# Patient Record
Sex: FEMALE
[De-identification: names, ages, dates, MRNs, and addresses within clinical notes are randomized per-mention and may not be internally consistent; named-entity substitution may affect disease eponyms.]

---

## 2018-07-10 ENCOUNTER — HOSPITAL ENCOUNTER (INPATIENT)
Dept: HOSPITAL 14 - H.ER | Age: 73
LOS: 3 days | Discharge: HOME | DRG: 574 | End: 2018-07-13
Attending: GENERAL ACUTE CARE HOSPITAL | Admitting: GENERAL ACUTE CARE HOSPITAL
Payer: COMMERCIAL

## 2018-07-10 DIAGNOSIS — E78.00: ICD-10-CM

## 2018-07-10 DIAGNOSIS — J98.11: ICD-10-CM

## 2018-07-10 DIAGNOSIS — Z79.82: ICD-10-CM

## 2018-07-10 DIAGNOSIS — Z92.3: ICD-10-CM

## 2018-07-10 DIAGNOSIS — E11.9: ICD-10-CM

## 2018-07-10 DIAGNOSIS — Z85.42: ICD-10-CM

## 2018-07-10 DIAGNOSIS — Z92.21: ICD-10-CM

## 2018-07-10 DIAGNOSIS — Z79.84: ICD-10-CM

## 2018-07-10 DIAGNOSIS — I10: ICD-10-CM

## 2018-07-10 DIAGNOSIS — K86.2: ICD-10-CM

## 2018-07-10 DIAGNOSIS — D72.828: Primary | ICD-10-CM

## 2018-07-10 DIAGNOSIS — M17.11: ICD-10-CM

## 2018-07-10 DIAGNOSIS — K59.00: ICD-10-CM

## 2018-07-10 LAB
ALBUMIN SERPL-MCNC: 4 G/DL (ref 3.5–5)
ALBUMIN/GLOB SERPL: 1.1 {RATIO} (ref 1–2.1)
ALT SERPL-CCNC: 30 U/L (ref 9–52)
APPEARANCE CSF: (no result)
APTT BLD: 32.1 SECONDS (ref 25.6–37.1)
AST SERPL-CCNC: 45 U/L (ref 14–36)
BACTERIA #/AREA URNS HPF: (no result) /[HPF]
BASE EXCESS BLDV CALC-SCNC: 4.4 MMOL/L (ref 0–2)
BASOPHILS # BLD AUTO: 0.1 K/UL (ref 0–0.2)
BASOPHILS NFR BLD: 0.4 % (ref 0–2)
BILIRUB UR-MCNC: NEGATIVE MG/DL
BODY FLD TYPE: (no result)
BUN SERPL-MCNC: 17 MG/DL (ref 7–17)
CALCIUM SERPL-MCNC: 9.3 MG/DL (ref 8.4–10.2)
COLOR UR: YELLOW
CSF MONO/MACROPHAGE: 1 % (ref 0–0)
CSF WBC: 1 /MM3 (ref 0–5)
EOSINOPHIL # BLD AUTO: 0.1 K/UL (ref 0–0.7)
EOSINOPHIL NFR BLD: 0.5 % (ref 0–4)
EOSINOPHIL NFR BLD: 1 % (ref 0–7)
ERYTHROCYTE [DISTWIDTH] IN BLOOD BY AUTOMATED COUNT: 13.3 % (ref 11.5–14.5)
GFR NON-AFRICAN AMERICAN: > 60
GLUCOSE UR STRIP-MCNC: 50 MG/DL
HGB BLD-MCNC: 12.4 G/DL (ref 12–16)
INR PPP: 1 (ref 0.9–1.2)
LEUKOCYTE ESTERASE UR-ACNC: (no result) LEU/UL
LYMPHOCYTE: 2 % (ref 20–50)
LYMPHOCYTES # BLD AUTO: 0.6 K/UL (ref 1–4.3)
LYMPHOCYTES NFR BLD AUTO: 3.3 % (ref 20–40)
MCH RBC QN AUTO: 31.6 PG (ref 27–31)
MCHC RBC AUTO-ENTMCNC: 33.3 G/DL (ref 33–37)
MCV RBC AUTO: 94.9 FL (ref 81–99)
MONOCYTE: 4 % (ref 0–10)
MONOCYTES # BLD: 0.9 K/UL (ref 0–0.8)
MONOCYTES NFR BLD: 4.5 % (ref 0–10)
NEUTROPHILS # BLD: 17.7 K/UL (ref 1.8–7)
NEUTROPHILS NFR BLD AUTO: 90 % (ref 42–75)
NEUTROPHILS NFR BLD AUTO: 91.3 % (ref 50–75)
NEUTS BAND NFR BLD: 3 % (ref 0–2)
NRBC BLD AUTO-RTO: 0 % (ref 0–0)
PCO2 BLDV: 43 MMHG (ref 40–60)
PH BLDV: 7.44 [PH] (ref 7.32–7.43)
PH UR STRIP: 7 [PH] (ref 5–8)
PLATELET # BLD EST: NORMAL 10*3/UL
PLATELET # BLD: 222 K/UL (ref 130–400)
PMV BLD AUTO: 8.6 FL (ref 7.2–11.7)
PROT UR STRIP-MCNC: NEGATIVE MG/DL
PROTHROMBIN TIME: 11.5 SECONDS (ref 9.8–13.1)
RBC # BLD AUTO: 3.93 MIL/UL (ref 3.8–5.2)
RBC # UR STRIP: NEGATIVE /UL
RBC MORPH BLD: NORMAL
SP GR UR STRIP: 1.02 (ref 1–1.03)
SPECIMEN VOL CSF: 1 ML (ref 0–1)
TOTAL CELLS COUNTED BLD: 100
URINE CLARITY: (no result)
UROBILINOGEN UR-MCNC: (no result) MG/DL (ref 0.2–1)
VENOUS BLOOD FIO2: 21 %
VENOUS BLOOD GAS PO2: 46 MM/HG (ref 30–55)
WBC # BLD AUTO: 19.4 K/UL (ref 4.8–10.8)

## 2018-07-10 NOTE — RAD
Date of service: 



07/10/2018



HISTORY:

Sepsis Patient  



COMPARISON:

She 03/27/2017 



FINDINGS:



LUNGS:

No active pulmonary disease.



PLEURA:

No significant pleural effusion identified, no pneumothorax apparent.



CARDIOVASCULAR:

Cardiomegaly.  No evidence of acute, significant cardiovascular 

disease. 



OSSEOUS STRUCTURES:

No significant abnormalities.



VISUALIZED UPPER ABDOMEN:

Normal.



OTHER FINDINGS:

Central hilar fullness likely vascular.



IMPRESSION:

No active disease. No significant interval change compared to the 

prior examination(s).

## 2018-07-10 NOTE — CT
Date of service: 



07/10/2018



PROCEDURE:  CT Abdomen and Pelvis with contrast



HISTORY:

LLQ pain, fever



COMPARISON:

None.



TECHNIQUE:

Contrast dose: 95 cc Omnipaque 300



Radiation dose:



Total exam DLP = 676.38 mGy-cm.



This CT exam was performed using one or more of the following dose 

reduction techniques: Automated exposure control, adjustment of the 

mA and/or kV according to patient size, and/or use of iterative 

reconstruction technique. . 



FINDINGS:



LOWER THORAX:

Minor bibasilar passive/dependent type atelectasis both lung bases. 

Minor scarring in the lingular and middle lobe regions as well. . No 

evidence of effusion or basilar pneumothorax. 



Heart size is enlarged. No significant pericardial effusion. 



Tiny hiatal hernia. 



LIVER:

Liver is upper limits of normal measuring over 18 cm in CC dimension. 

Minor fatty hepatic infiltration. No obvious hepatic mass collection 

or calcification.  Portal and splenic veins are opacified. 



GALLBLADDER AND BILE DUCTS:

Cholecystectomy 



PANCREAS:

Pancreas is atrophic fatty replaced. .  There is a well-circumscribed 

small elliptical shaped approximately 14 point 4 x 10.2 times 13 

point 3 mm low-attenuation lesion within the mid to distal body of 

the pancreas that exhibits Hounsfield units in the ranging between 

mid single digits.  There also appears to be a tiny calcification 

along the inferior margin of this low-attenuation lesion. . The 

lesion is of uncertain etiology though could represent a pseudocyst 

possibility of a cystic neoplasm not excluded. .  Followup the CT 

scan at interval could be performed to assess stability. Clinical 

correlation with GI consultation recommended for further evaluation. 



SPLEEN:

The spleen exhibits normal size and attenuation pattern.  No masses 

collections or calcifications. 



ADRENALS:

There are no adrenal lesions 



KIDNEYS AND URETERS:

Kidneys demonstrate symmetric nephrograms.  No evidence of 

nephrolithiasis or hydronephrosis.  No obvious renal mass or 

collection. 



VASCULATURE:

Unremarkable. No aortic aneurysm. 



BOWEL:

Evaluation of the bowel is limited due to the lack of oral contrast 

material. 



The stomach is incompletely distended which presumably accounts for 

thick-walled appearance.  Visualized loops of small bowel exhibit 

normal contour and caliber. No evidence of acute mechanical small 

bowel obstruction.  There there is fecalized content within the 

distal small bowel suggesting stasis. Note made of a few loops of 

proximal small bowel left mid abdomen which exhibit questionable mild 

wall thickening.  Rule out enteritis. 



. 



Stool and air seen throughout the large bowel suggesting mild fecal 

retention. No definitive mural wall thickening of the colon. . 



APPENDIX:

What may represent a short but normal-appearing appendix seen on 

coronal image number [id number] and axial image number [id number]. No 

periappendiceal inflammatory changes. 



PERITONEUM:

Unremarkable. No free fluid. No free air. Small fat containing 

bilateral inguinal hernias are present left slightly larger than 

right. 



LYMPH NODES:

Multiple small nonspecific retroperitoneal lymph nodes are noted. . 



BLADDER:

Urinary bladder is incompletely distended which in part accounts for 

thick-walled appearance however cystitis must be excluded.  

Correlation urinalysis. 



REPRODUCTIVE:

Uterus unremarkable aside from what probably represents some 

myometrial vascular calcifications. 



BONES:

Mild multilevel degenerative spondylosis of the lower thoracic and 

lumbar spine. 



No acute compression fractures no retropulsed fragments. 



OTHER FINDINGS:

None.



IMPRESSION:

Liver is upper limits of normal.  Mild fatty hepatic infiltration. 



Cholecystectomy. 



There is a small cystic lesion within the mid body of the pancreas of 

uncertain etiology. Rule out pseudocyst versus cystic neoplasm.  GI 

consultation suggested. 



Note made of a few loops of proximal small bowel left mid abdomen 

which exhibit questionable mild wall thickening.  Rule out enteritis. 



Findings consistent with mild fecal retention/constipation as 

described above. 



Urinary bladder wall thickening likely due to incomplete distention 

however correlation with urinalysis recommended to exclude the 

possibility of a cystitis

## 2018-07-10 NOTE — US
Date of service: 



07/10/2018



PROCEDURE:  Right lower extremity venous duplex Doppler. 



HISTORY:

pain on right thigh







COMPARISON:

None available.



TECHNIQUE:

Common femoral, superficial femoral, popliteal and posterior tibial 

veins were evaluated. Flow was assessed with color Doppler, 

compressibility, assessment of phasic flow and augmentation response. 



FINDINGS:



COMMON FEMORAL VEIN:

Unremarkable.



SUPERFICIAL FEMORAL VEIN:

Unremarkable.



POPLITEAL VEIN:

Unremarkable.



POSTERIOR TIBIAL VEIN:

Unremarkable.



OTHER FINDINGS:

None.



IMPRESSION:

No evidence of deep venous thrombosis in the right lower extremity.

## 2018-07-10 NOTE — CP.PCM.HP
History of Present Illness





- History of Present Illness


History of Present Illness: 





72 yo female with history of DM2 and HTN brought by family because of fever and 

chills since yesterday. The only accompanying symptoms were headache and right 

thigh pain probably associated with a fall last week. Denied abdominal pain, 

nausea/vomiting or diarrhea. Also denied dysuria, coughing, chest pain or SOB.














Present on Admission





- Present on Admission


Any Indicators Present on Admission: No


History of DVT/PE: No


History of Uncontrolled Diabetes: No


Urinary Catheter: No


Decubitus Ulcer Present: No





Review of Systems





- Review of Systems


All systems: reviewed and no additional remarkable complaints except (aside 

from those mentioned above, 12 point system review were negative by me)





Past Patient History





- Tetanus Immunizations


Tetanus Immunization: Unknown





- Past Medical History & Family History


Past Medical History?: Yes





- Past Social History


Smoking Status: Never Smoked


Chewing Tobacco Use: No


Cigar Use: No


Alcohol: None


Drugs: Denies


Home Situation {Lives}: With Family





- CARDIAC


Hx Hypercholesterolemia: Yes


Hx Hypertension: Yes





- PULMONARY


Hx Respiratory Disorders: No





- NEUROLOGICAL


Hx Neurological Disorder: No





- HEENT


Hx HEENT Problems: No





- RENAL


Hx Chronic Kidney Disease: No





- ENDOCRINE/METABOLIC


Hx Endocrine Disorders: Yes


Hx Diabetes Mellitus Type 2: Yes





- HEMATOLOGICAL/ONCOLOGICAL


Hx Human Immunodeficiency Virus (HIV): No





- INTEGUMENTARY


Hx Dermatological Problems: No





- MUSCULOSKELETAL/RHEUMATOLOGICAL


Hx Musculoskeletal Disorders: No





- GASTROINTESTINAL


Hx Pancreatitis: Yes





- GENITOURINARY/GYNECOLOGICAL


Hx Genitourinary Disorders: Yes


Hx Uterine Cancer: Yes (treated with Radiation and chemo 13 yrs ago)


Other/Comment: Hx cystitis





- PSYCHIATRIC


Hx Psychophysiologic Disorder: No


Hx Substance Use: No





- SURGICAL HISTORY


Hx Cholecystectomy: Yes





- ANESTHESIA


Hx Anesthesia: Yes


Hx Anesthesia Reactions: No


Hx Malignant Hyperthermia: No





Meds


Allergies/Adverse Reactions: 


 Allergies











Allergy/AdvReac Type Severity Reaction Status Date / Time


 


No Known Allergies Allergy   Verified 04/15/18 23:54














Physical Exam





- Constitutional


Appears: No Acute Distress





- Head Exam


Head Exam: ATRAUMATIC





- Eye Exam


Eye Exam: PERRL.  absent: Scleral icterus





- ENT Exam


ENT Exam: Mucous Membranes Moist





- Neck Exam


Neck exam: Negative for: Meningismus





- Respiratory Exam


Respiratory Exam: absent: Rales, Rhonchi, Wheezes, Respiratory Distress





- Cardiovascular Exam


Cardiovascular Exam: REGULAR RHYTHM, +S1, +S2





- GI/Abdominal Exam


GI & Abdominal Exam: Soft.  absent: Tenderness





- Extremities Exam


Extremities exam: Positive for: normal inspection (there is no bruising, 

swelling or tenderness on right thigh or any other limbs)





Results





- Vital Signs


Recent Vital Signs: 





 Last Vital Signs











Temp  101.6 F H  07/10/18 09:35


 


Pulse  104 H  07/10/18 09:35


 


Resp      


 


BP  148/80   07/10/18 09:35


 


Pulse Ox  96   07/10/18 10:56














- Labs


Result Diagrams: 


 07/10/18 11:05





 07/10/18 11:05


Labs: 





 Laboratory Results - last 24 hr











  07/10/18 07/10/18 07/10/18





  11:05 11:05 11:05


 


WBC  19.4 H D  


 


RBC  3.93  


 


Hgb  12.4  


 


Hct  37.3  


 


MCV  94.9  


 


MCH  31.6 H  


 


MCHC  33.3  


 


RDW  13.3  


 


Plt Count  222  


 


MPV  8.6  


 


Neut % (Auto)  91.3 H  


 


Lymph % (Auto)  3.3 L  


 


Mono % (Auto)  4.5  


 


Eos % (Auto)  0.5  


 


Baso % (Auto)  0.4  


 


Neut # (Auto)  17.7 H  


 


Lymph # (Auto)  0.6 L  


 


Mono # (Auto)  0.9 H  


 


Eos # (Auto)  0.1  


 


Baso # (Auto)  0.1  


 


Neutrophils % (Manual)  90 H  


 


Band Neutrophils %  3 H  


 


Lymphocytes % (Manual)  2 L  


 


Monocytes % (Manual)  4  


 


Eosinophils % (Manual)  1  


 


Platelet Estimate  Normal  


 


RBC Morphology  Normal  


 


PT    11.5


 


INR    1.0


 


APTT    32.1


 


pO2   


 


VBG pH   


 


VBG pCO2   


 


VBG HCO3   


 


VBG Total CO2   


 


VBG O2 Sat (Calc)   


 


VBG Base Excess   


 


VBG Potassium   


 


Glucose   


 


Lactate   


 


FiO2   


 


Sodium   139 


 


Potassium   3.7 


 


Chloride   102 


 


Carbon Dioxide   26 


 


Anion Gap   15 


 


BUN   17 


 


Creatinine   0.6 L 


 


Est GFR ( Amer)   > 60 


 


Est GFR (Non-Af Amer)   > 60 


 


POC Glucose (mg/dL)   


 


Random Glucose   144 H 


 


Calcium   9.3 


 


Phosphorus   2.4 L 


 


Magnesium   1.6 


 


Total Bilirubin   0.6 


 


AST   45 H D 


 


ALT   30 


 


Alkaline Phosphatase   82 


 


Total Protein   7.5 


 


Albumin   4.0 


 


Globulin   3.5 


 


Albumin/Globulin Ratio   1.1 


 


Venous Blood Potassium   


 


Urine Color   


 


Urine Clarity   


 


Urine pH   


 


Ur Specific Gravity   


 


Urine Protein   


 


Urine Glucose (UA)   


 


Urine Ketones   


 


Urine Blood   


 


Urine Nitrate   


 


Urine Bilirubin   


 


Urine Urobilinogen   


 


Ur Leukocyte Esterase   


 


Urine RBC (Auto)   


 


Urine Microscopic WBC   


 


Urine Bacteria   














  07/10/18 07/10/18 07/10/18





  11:15 11:22 11:35


 


WBC   


 


RBC   


 


Hgb   


 


Hct   


 


MCV   


 


MCH   


 


MCHC   


 


RDW   


 


Plt Count   


 


MPV   


 


Neut % (Auto)   


 


Lymph % (Auto)   


 


Mono % (Auto)   


 


Eos % (Auto)   


 


Baso % (Auto)   


 


Neut # (Auto)   


 


Lymph # (Auto)   


 


Mono # (Auto)   


 


Eos # (Auto)   


 


Baso # (Auto)   


 


Neutrophils % (Manual)   


 


Band Neutrophils %   


 


Lymphocytes % (Manual)   


 


Monocytes % (Manual)   


 


Eosinophils % (Manual)   


 


Platelet Estimate   


 


RBC Morphology   


 


PT   


 


INR   


 


APTT   


 


pO2  46  


 


VBG pH  7.44 H  


 


VBG pCO2  43  


 


VBG HCO3  28.0  


 


VBG Total CO2  30.5 H  


 


VBG O2 Sat (Calc)  89.0 H  


 


VBG Base Excess  4.4 H  


 


VBG Potassium  3.4 L  


 


Glucose  151 H  


 


Lactate  1.9  


 


FiO2  21.0  


 


Sodium  135.0  


 


Potassium   


 


Chloride  102.0  


 


Carbon Dioxide   


 


Anion Gap   


 


BUN   


 


Creatinine   


 


Est GFR ( Amer)   


 


Est GFR (Non-Af Amer)   


 


POC Glucose (mg/dL)   139 H 


 


Random Glucose   


 


Calcium   


 


Phosphorus   


 


Magnesium   


 


Total Bilirubin   


 


AST   


 


ALT   


 


Alkaline Phosphatase   


 


Total Protein   


 


Albumin   


 


Globulin   


 


Albumin/Globulin Ratio   


 


Venous Blood Potassium  3.4 L  


 


Urine Color    Yellow


 


Urine Clarity    Slighty-cloudy


 


Urine pH    7.0


 


Ur Specific Gravity    1.016


 


Urine Protein    Negative


 


Urine Glucose (UA)    50


 


Urine Ketones    Negative


 


Urine Blood    Negative


 


Urine Nitrate    Negative


 


Urine Bilirubin    Negative


 


Urine Urobilinogen    0.2-1.0


 


Ur Leukocyte Esterase    Neg


 


Urine RBC (Auto)    3


 


Urine Microscopic WBC    < 1


 


Urine Bacteria    Rare














Assessment & Plan





- Assessment and Plan (Free Text)


Assessment: 





72 yo female with history of DM2 and HTN brought by family because of fever and 

chills since yesterday. The only accompanying symptoms were headache and 


right thigh pain probably associated with a fall last week. Denied abdominal 

pain, nausea/vomiting or diarrhea. Also denied dysuria, coughing, chest pain or 

SOB.








1. Fever of Unknown Origin


CT scan of abdomen: thick walled on stomach, small intestine and urinary bladder

???


patient did not have abdominal issue nor urinary problem


CXray: did not show any infiltrate


blood and urine culture


ID consult with Dr Edgar


LP done with clear CSF with elevated total cell ct of 22, neutrophil - 1; 

lymphocytes - 16; monos - 1


continue IV Acyclovir, Vanco and Ceftriaxone

## 2018-07-10 NOTE — CT
Date of service: 



07/10/2018



PROCEDURE:  CT HEAD WITHOUT CONTRAST.



HISTORY:

HA



COMPARISON:

Comparison made with CT scan brain dated 10/25/2014 



TECHNIQUE:

Axial computed tomography images were obtained through the head/brain 

without intravenous contrast.  



Radiation dose:



Total exam DLP = 1052.87 mGy-cm.



This CT exam was performed using one or more of the following dose 

reduction techniques: Automated exposure control, adjustment of the 

mA and/or kV according to patient size, and/or use of iterative 

reconstruction technique.



FINDINGS:



HEMORRHAGE:

No intracranial hemorrhage. 



BRAIN:

No evidence of large acute infarct.  Questionable few tiny chronic 

bilateral basal nuclei lacunar type infarcts. 



Ventricular and sulcal size are within range of normal for this 

patient's stated age 



VENTRICLES:

No obstructive hydrocephalus. 



CALVARIUM:

Unremarkable.



PARANASAL SINUSES:

Unremarkable as visualized. No significant inflammatory changes.



MASTOID AIR CELLS:

Unremarkable as visualized. No inflammatory changes.



OTHER FINDINGS:

None.



IMPRESSION:

No evidence of acute intracranial hemorrhage or infarct.  

Questionable few chronic bilateral basal nuclei lacunar type infarcts.

## 2018-07-10 NOTE — ED PDOC
HPI: General Adult





<Janette Schulte Y - Last Filed: 07/10/18 15:33>


Chief Complaint (Provider): Fever


History Per: Patient, Family, 


History/Exam Limitations: no limitations





<Shania Schwarz - Last Filed: 07/11/18 15:08>


Time Seen by Provider: 07/10/18 10:04


Chief Complaint (Nursing): Fever


Additional Complaint(s): 





Pt reports HA and R leg pain that started after trip and fall on street 1 week 

ago, hit head on wall, no LOC.  Also c/o fever that started yesterday, did not 

take any medications, associated with dysuria.  Denies neck stiffness, sore 

throat, cough, nausea, vomiting, diarrhea.  (ChongPrasantha CASSANDRA)





Past Medical History





<Janette Schulte Y - Last Filed: 07/10/18 15:33>


Reviewed: Nursing Documentation, Vital Signs





- Medical History


PMH: Diabetes, HTN, Hypercholesterolemia, Pancreatitis


   Denies: HIV, Chronic Kidney Disease





- Surgical History


Surgical History: Cholecystectomy





- Family History


Family History: States: Unknown Family Hx





- Living Arrangements


Living Arrangements: With Family





- Social History


Current smoker - smoking cessation education provided: No


Alcohol: None





<Shania Schwarz - Last Filed: 07/11/18 15:08>


Vital Signs: 


 Last Vital Signs











Temp  97.9 F   07/11/18 08:08


 


Pulse  101 H  07/11/18 09:41


 


Resp  20   07/11/18 08:08


 


BP  110/62   07/11/18 09:41


 


Pulse Ox  94 L  07/11/18 08:08














- Home Medications


Home Medications: 


 Ambulatory Orders











 Medication  Instructions  Recorded


 


Lisinopril [Zestril] 5 mg PO DAILY 03/28/17


 


Metformin HCl [Glucophage] 1,000 mg PO BID 03/28/17


 


Simvastatin [Zocor] 40 mg PO DAILY 03/28/17


 


Aspirin [Ecotrin] 81 mg PO DAILY 07/10/18














- Allergies


Allergies/Adverse Reactions: 


 Allergies











Allergy/AdvReac Type Severity Reaction Status Date / Time


 


No Known Allergies Allergy   Verified 04/15/18 23:54














Review of Systems


Constitutional: Positive for: Fever, Chills


Eyes: Negative for: Vision Change, Eyelid Inflammation


ENT: Negative for: Ear Pain, Ear Discharge, Nose Pain, Nose Discharge, Nose 

Congestion, Mouth Pain, Mouth Swelling, Throat Pain, Throat Swelling


Cardiovascular: Negative for: Chest Pain, Palpitations


Respiratory: Negative for: Cough, Hemoptysis


Gastrointestinal: Negative for: Nausea, Vomiting, Abdominal Pain, Diarrhea


Genitourinary Female: Positive for: Dysuria.  Negative for: Hematuria, Vaginal 

Discharge, Vaginal Bleeding, Pelvic Pain


Musculoskeletal: Positive for: Back Pain (Chronic).  Negative for: Neck Pain


Skin: Negative for: Rash, Lesions


Neurological: Positive for: Headache.  Negative for: Weakness, Numbness, 

Incoordination, Change in Speech, Confusion, Seizures, Altered Mental Status, 

Dizziness





<Shania Schwarz F - Last Filed: 07/11/18 15:08>





Physical Exam





- Reviewed


Nursing Documentation Reviewed: Yes


Vital Signs Reviewed: Yes





- Physical Exam


Appears: Positive for: Uncomfortable


Head Exam: Positive for: ATRAUMATIC, NORMAL INSPECTION


Skin: Positive for: Normal Color, Warm, Dry


Eye Exam: Positive for: Normal appearance, EOMI, PERRL


ENT: Positive for: Pharynx Is (Clear).  Negative for: Nasal Congestion, 

Pharyngeal Erythema, Tonsillar Exudate, Tonsillar Swelling


Neck: Positive for: Normal, Painless ROM, Supple


Cardiovascular/Chest: Positive for: Regular Rate, Rhythm


Respiratory: Positive for: Normal Breath Sounds.  Negative for: Rales, Rhonchi, 

Wheezing


Gastrointestinal/Abdominal: Positive for: Bowel Sounds, Soft, Tenderness (LLQ).

  Negative for: Distended, Guarding, Rebound


Back: Positive for: Normal Inspection.  Negative for: L CVA Tenderness, R CVA 

Tenderness


Extremity: Positive for: Normal ROM, Tenderness (Minimal L lateral thigh), 

Swelling (Minimal hematoma), Other (FROM @ hip, no pain on pelvic rocking ).  

Negative for: Deformity


Neurologic/Psych: Positive for: Alert, Oriented





<Shania Schwarz F - Last Filed: 07/11/18 15:08>





- Laboratory Results


Result Diagrams: 


 07/10/18 11:05





 07/10/18 11:05





<Janette Schulte - Last Filed: 07/10/18 15:33>





- Laboratory Results


Result Diagrams: 


 07/11/18 06:40





 07/11/18 06:40





- ECG


O2 Sat by Pulse Oximetry: 96





- Critical Care


Total Time (In Min): 45





<Shania Schwarz - Last Filed: 07/11/18 15:08>





Medical Decision Making





<Janette Schulte Y - Last Filed: 07/10/18 15:33>





<Shania Schwarz F - Last Filed: 07/11/18 15:08>


Medical Decision Making: 


Time; 10:30


74 yo female with head and RLE pain s/p fall and fever.


- labs


- EKG


- CXR


- CT head


- IVF


- Motrin


- Tylenol





Head CT 


FINDINGS:





HEMORRHAGE:


No intracranial hemorrhage. 





BRAIN:


No evidence of large acute infarct.  Questionable few tiny chronic bilateral 

basal nuclei lacunar type infarcts. 





Ventricular and sulcal size are within range of normal for this patient's 

stated age 





VENTRICLES:


No obstructive hydrocephalus. 





CALVARIUM:


Unremarkable.





PARANASAL SINUSES:


Unremarkable as visualized. No significant inflammatory changes.





MASTOID AIR CELLS:


Unremarkable as visualized. No inflammatory changes.





OTHER FINDINGS:


None.





IMPRESSION:


No evidence of acute intracranial hemorrhage or infarct.  Questionable few 

chronic bilateral basal nuclei lacunar type infarcts.





Abd pelvis CT 


FINDINGS:





LOWER THORAX:


Minor bibasilar passive/dependent type atelectasis both lung bases. Minor 

scarring in the lingular and middle lobe regions as well. . No evidence of 

effusion or basilar pneumothorax. 





Heart size is enlarged. No significant pericardial effusion. 





Tiny hiatal hernia. 





LIVER:


Liver is upper limits of normal measuring over 18 cm in CC dimension. Minor 

fatty hepatic infiltration. No obvious hepatic mass collection or 

calcification.  Portal and splenic veins are opacified. 





GALLBLADDER AND BILE DUCTS:


Cholecystectomy 





PANCREAS:


Pancreas is atrophic fatty replaced. .  There is a well-circumscribed small 

elliptical shaped approximately 14 point 4 x 10.2 times 13 point 3 mm low-

attenuation lesion within the mid to distal body of the pancreas that exhibits 

Hounsfield units in the ranging between mid single digits.  There also appears 

to be a tiny calcification along the inferior margin of this low-attenuation 

lesion. . The lesion is of uncertain etiology though could represent a 

pseudocyst possibility of a cystic neoplasm not excluded. .  Followup the CT 

scan at interval could be performed to assess stability. Clinical correlation 

with GI consultation recommended for further evaluation. 





SPLEEN:


The spleen exhibits normal size and attenuation pattern.  No masses collections 

or calcifications. 





ADRENALS:


There are no adrenal lesions 





KIDNEYS AND URETERS:


Kidneys demonstrate symmetric nephrograms.  No evidence of nephrolithiasis or 

hydronephrosis.  No obvious renal mass or collection. 





VASCULATURE:


Unremarkable. No aortic aneurysm. 





BOWEL:


Evaluation of the bowel is limited due to the lack of oral contrast material. 





The stomach is incompletely distended which presumably accounts for thick-

walled appearance.  Visualized loops of small bowel exhibit normal contour and 

caliber. No evidence of acute mechanical small bowel obstruction.  There there 

is fecalized content within the distal small bowel suggesting stasis. Note made 

of a few loops of proximal small bowel left mid abdomen which exhibit 

questionable mild wall thickening.  Rule out enteritis. 





. 





Stool and air seen throughout the large bowel suggesting mild fecal retention. 

No definitive mural wall thickening of the colon. . 





APPENDIX:


What may represent a short but normal-appearing appendix seen on coronal image 

number [id number] and axial image number [id number]. No periappendiceal inflammatory 

changes. 





PERITONEUM:


Unremarkable. No free fluid. No free air. Small fat containing bilateral 

inguinal hernias are present left slightly larger than right. 





LYMPH NODES:


Multiple small nonspecific retroperitoneal lymph nodes are noted. . 





BLADDER:


Urinary bladder is incompletely distended which in part accounts for thick-

walled appearance however cystitis must be excluded.  Correlation urinalysis. 





REPRODUCTIVE:


Uterus unremarkable aside from what probably represents some myometrial 

vascular calcifications. 





BONES:


Mild multilevel degenerative spondylosis of the lower thoracic and lumbar 

spine. 





No acute compression fractures no retropulsed fragments. 





OTHER FINDINGS:


None.





IMPRESSION:


Liver is upper limits of normal.  Mild fatty hepatic infiltration. 





Cholecystectomy. 





There is a small cystic lesion within the mid body of the pancreas of uncertain 

etiology. Rule out pseudocyst versus cystic neoplasm.  GI consultation 

suggested. 





Note made of a few loops of proximal small bowel left mid abdomen which exhibit 

questionable mild wall thickening.  Rule out enteritis. 





Findings consistent with mild fecal retention/constipation as described above. 





Urinary bladder wall thickening likely due to incomplete distention however 

correlation with urinalysis recommended to exclude the possibility of a cystitis








Time; 13:51 


Patient will be admitted to inpatient care. Admitting diagnoses are enteritis 

and SIRS. 





 





--------------------------------------------------------------------------------

----


Scribe Attestation: 


Documented by Ivon Lazo, acting as a scribe for Shania Schwarz MD





Provider Scribe Attestation:


All medical record entries made by the Scribe were at my direction and 

personally dictated by me. I have reviewed the chart and agree that the record 

accurately reflects my personal performance of the history, physical exam, 

medical decision making, and the department course for this patient. I have 

also personally directed, reviewed, and agree with the discharge instructions 

and disposition. (Shania Schwarz)





Procedures





<Janette Schulte - Last Filed: 07/10/18 15:33>





- Additional Procedures


Additional Procedures: lumbar puncture





<Shania Schwazr - Last Filed: 07/11/18 15:08>





- Additional Procedures


Progress: 


Patient tolerated procedure well. 


 (Shania Schwarz)





Disposition





<Janette Schulte - Last Filed: 07/10/18 15:33>





- Patient ED Disposition


Is Patient to be Admitted: Yes





- Disposition


Disposition Time: 13:51





- Pt Status Changed To:


Hospital Disposition Of: Inpatient





- Admit Certification


Admit to Inpatient:: After my assessment, the patient will require 

hospitalization for at least two midnights.  This is because of the severity of 

symptoms shown, intensity of services needed, and/or the medical risk in this 

patient being treated as an outpatient.





- POA


Present On Arrival: Falls Or Trauma





<Shania Schwarz - Last Filed: 07/11/18 15:08>





- Clinical Impression


Clinical Impression: 


 Fever in adult








- Disposition


Condition: STABLE

## 2018-07-11 LAB
BASOPHILS # BLD AUTO: 0 K/UL (ref 0–0.2)
BASOPHILS NFR BLD: 0.1 % (ref 0–2)
BUN SERPL-MCNC: 18 MG/DL (ref 7–17)
CALCIUM SERPL-MCNC: 9 MG/DL (ref 8.4–10.2)
EOSINOPHIL # BLD AUTO: 0 K/UL (ref 0–0.7)
EOSINOPHIL NFR BLD: 0 % (ref 0–4)
ERYTHROCYTE [DISTWIDTH] IN BLOOD BY AUTOMATED COUNT: 13.4 % (ref 11.5–14.5)
GFR NON-AFRICAN AMERICAN: > 60
HGB BLD-MCNC: 12 G/DL (ref 12–16)
LYMPHOCYTE: 3 % (ref 20–50)
LYMPHOCYTES # BLD AUTO: 0.5 K/UL (ref 1–4.3)
LYMPHOCYTES NFR BLD AUTO: 2.6 % (ref 20–40)
MCH RBC QN AUTO: 31.8 PG (ref 27–31)
MCHC RBC AUTO-ENTMCNC: 33.4 G/DL (ref 33–37)
MCV RBC AUTO: 95.1 FL (ref 81–99)
MONOCYTE: 1 % (ref 0–10)
MONOCYTES # BLD: 0.2 K/UL (ref 0–0.8)
MONOCYTES NFR BLD: 1.3 % (ref 0–10)
NEUTROPHILS # BLD: 17.5 K/UL (ref 1.8–7)
NEUTROPHILS NFR BLD AUTO: 94 % (ref 42–75)
NEUTROPHILS NFR BLD AUTO: 96 % (ref 50–75)
NEUTS BAND NFR BLD: 2 % (ref 0–2)
NRBC BLD AUTO-RTO: 0.1 % (ref 0–0)
PLATELET # BLD EST: NORMAL 10*3/UL
PLATELET # BLD: 234 K/UL (ref 130–400)
PMV BLD AUTO: 8.9 FL (ref 7.2–11.7)
RBC # BLD AUTO: 3.79 MIL/UL (ref 3.8–5.2)
RBC MORPH BLD: NORMAL
TOTAL CELLS COUNTED BLD: 100
WBC # BLD AUTO: 18.2 K/UL (ref 4.8–10.8)

## 2018-07-11 RX ADMIN — PANTOPRAZOLE SODIUM SCH MG: 40 TABLET, DELAYED RELEASE ORAL at 09:27

## 2018-07-11 RX ADMIN — WATER SCH MLS/HR: 1 INJECTION INTRAMUSCULAR; INTRAVENOUS; SUBCUTANEOUS at 17:26

## 2018-07-11 RX ADMIN — WATER SCH MLS/HR: 1 INJECTION INTRAMUSCULAR; INTRAVENOUS; SUBCUTANEOUS at 21:59

## 2018-07-11 RX ADMIN — ENOXAPARIN SODIUM SCH MG: 40 INJECTION SUBCUTANEOUS at 09:25

## 2018-07-11 NOTE — CP.PCM.CON
History of Present Illness





- History of Present Illness


History of Present Illness: 





Infectious disease Consultation Note-





Asked to see this patient at the request of the hospitalist for fever .





HPI-


Pt. is a 73 year old female with PMH of HTN and DM II, who was brought in by 

her family to be evaluated because of c/o chills and HA.


pt. had LP done in ED but wbc cell count is 1 and not c/w meningitis.


pt. states she feels well and wants to go home.


She denies any further HA, denies any fever or chills today, denies nay cough 

or sob, denies any chest pain, denies any abd. pain,d enies any nausea or 

vomiting, denies any dysurea, denies any diarrhea.


denies any recent travel


denies any sick contacts.








Review of Systems





- Review of Systems


Review of Systems: 





ROS-


had chills yesterday but denies any chills or any fever today, denies any HA 

today, denies any neck pain, denies any cough or sob, denies any alee pain, 

denies any abd. pain, denies any dysurea, denies any diarrhea.





Past Patient History





- Tetanus Immunizations


Tetanus Immunization: Unknown





- Past Medical History & Family History


Past Medical History?: Yes





- Past Social History


Smoking Status: Never Smoked


Chewing Tobacco Use: No


Cigar Use: No


Alcohol: None


Drugs: Denies


Home Situation {Lives}: With Family





- CARDIAC


Hx Hypercholesterolemia: Yes


Hx Hypertension: Yes





- PULMONARY


Hx Respiratory Disorders: No





- NEUROLOGICAL


Hx Neurological Disorder: No





- HEENT


Hx HEENT Problems: No





- RENAL


Hx Chronic Kidney Disease: No





- ENDOCRINE/METABOLIC


Hx Endocrine Disorders: Yes


Hx Diabetes Mellitus Type 2: Yes





- HEMATOLOGICAL/ONCOLOGICAL


Hx Blood Disorders: No





- INTEGUMENTARY


Hx Dermatological Problems: No





- MUSCULOSKELETAL/RHEUMATOLOGICAL


Hx Musculoskeletal Disorders: No





- GASTROINTESTINAL


Hx Pancreatitis: Yes





- GENITOURINARY/GYNECOLOGICAL


Hx Genitourinary Disorders: Yes


Hx Uterine Cancer: Yes (treated with Radiation and chemo 13 yrs ago)


Other/Comment: Hx cystitis





- PSYCHIATRIC


Hx Psychophysiologic Disorder: No


Hx Substance Use: No





- SURGICAL HISTORY


Hx Cholecystectomy: Yes





- ANESTHESIA


Hx Anesthesia: Yes


Hx Anesthesia Reactions: No


Hx Malignant Hyperthermia: No





Meds


Allergies/Adverse Reactions: 


 Allergies











Allergy/AdvReac Type Severity Reaction Status Date / Time


 


No Known Allergies Allergy   Verified 04/15/18 23:54














- Medications


Medications: 


 Current Medications





Acetaminophen (Tylenol 325mg Tab)  650 mg PO Q6 PRN


   PRN Reason: Pain, Mild (1-3)


Acetaminophen (Tylenol 325mg Tab)  650 mg PO Q6 PRN


   PRN Reason: Fever >100.4 F


Aspirin (Ecotrin)  81 mg PO DAILY [facility]


   Last Admin: 07/11/18 09:25 Dose:  81 mg


Atorvastatin Calcium (Lipitor)  20 mg PO DAILY [facility]


   Last Admin: 07/11/18 09:25 Dose:  20 mg


Docusate Sodium (Colace)  100 mg PO BID [facility]


   Last Admin: 07/11/18 09:24 Dose:  100 mg


Enoxaparin Sodium (Lovenox)  40 mg SC DAILY [facility]


   PRN Reason: Protocol


   Last Admin: 07/11/18 09:25 Dose:  40 mg


Sodium Chloride (Sodium Chloride 0.9%)  1,000 mls @ 100 mls/hr IV .Q10H [facility]


   Last Admin: 07/10/18 18:54 Dose:  100 mls/hr


Vancomycin HCl 1 gm/ Sodium (Chloride)  250 mls @ 166.667 mls/hr IVPB Q12 [facility]


   PRN Reason: Protocol


   Last Admin: 07/11/18 09:40 Dose:  166.667 mls/hr


Ceftriaxone Sodium 1 gm/ (Sodium Chloride)  100 mls @ 100 mls/hr IVPB DAILY [facility]


   PRN Reason: Protocol


   Last Admin: 07/11/18 09:27 Dose:  100 mls/hr


Acyclovir 500 mg/ Sodium (Chloride)  100 mls @ 100 mls/hr IVPB Q8 [facility]


   PRN Reason: Protocol


   Last Admin: 07/11/18 09:42 Dose:  100 mls/hr


Lisinopril (Zestril)  5 mg PO DAILY [facility]


   Last Admin: 07/11/18 09:41 Dose:  5 mg


Metformin HCl (Glucophage)  1,000 mg PO BID [facility]


   Last Admin: 07/11/18 09:25 Dose:  1,000 mg


Pantoprazole Sodium (Protonix Ec Tab)  40 mg PO DAILY [facility]


   Last Admin: 07/11/18 09:27 Dose:  40 mg











Physical Exam





- Constitutional


Appears: No Acute Distress





- Head Exam


Head Exam: ATRAUMATIC





- Eye Exam


Eye Exam: EOMI





- ENT Exam


ENT Exam: Normal Oropharynx





- Neck Exam


Neck exam: Positive for: Full Rom


Additional comments: 





supple





- Respiratory Exam


Respiratory Exam: NORMAL BREATHING PATTERN


Additional comments: 





no wheezing


slightly decreased breath sounds at right base





- Cardiovascular Exam


Cardiovascular Exam: RRR, +S1, +S2





- Extremities Exam


Extremities exam: Positive for: normal inspection





- Neurological Exam


Neurological exam: Alert, Oriented x3





Results





- Vital Signs


Recent Vital Signs: 


 Last Vital Signs











Temp  97.9 F   07/11/18 08:08


 


Pulse  101 H  07/11/18 09:41


 


Resp  20   07/11/18 08:08


 


BP  110/62   07/11/18 09:41


 


Pulse Ox  94 L  07/11/18 08:08














- Labs


Result Diagrams: 


 07/11/18 06:40





 07/11/18 06:40


Labs: 


 Laboratory Results - last 24 hr











  07/10/18 07/10/18 07/10/18





  11:05 11:05 11:05


 


WBC  19.4 H D  


 


RBC  3.93  


 


Hgb  12.4  


 


Hct  37.3  


 


MCV  94.9  


 


MCH  31.6 H  


 


MCHC  33.3  


 


RDW  13.3  


 


Plt Count  222  


 


MPV  8.6  


 


Neut % (Auto)  91.3 H  


 


Lymph % (Auto)  3.3 L  


 


Mono % (Auto)  4.5  


 


Eos % (Auto)  0.5  


 


Baso % (Auto)  0.4  


 


Neut # (Auto)  17.7 H  


 


Lymph # (Auto)  0.6 L  


 


Mono # (Auto)  0.9 H  


 


Eos # (Auto)  0.1  


 


Baso # (Auto)  0.1  


 


Neutrophils % (Manual)  90 H  


 


Band Neutrophils %  3 H  


 


Lymphocytes % (Manual)  2 L  


 


Monocytes % (Manual)  4  


 


Eosinophils % (Manual)  1  


 


Platelet Estimate  Normal  


 


RBC Morphology  Normal  


 


PT    11.5


 


INR    1.0


 


APTT    32.1


 


D-Dimer, Quantitative   


 


pO2   


 


VBG pH   


 


VBG pCO2   


 


VBG HCO3   


 


VBG Total CO2   


 


VBG O2 Sat (Calc)   


 


VBG Base Excess   


 


VBG Potassium   


 


Glucose   


 


Lactate   


 


FiO2   


 


Sodium   139 


 


Potassium   3.7 


 


Chloride   102 


 


Carbon Dioxide   26 


 


Anion Gap   15 


 


BUN   17 


 


Creatinine   0.6 L 


 


Est GFR ( Amer)   > 60 


 


Est GFR (Non-Af Amer)   > 60 


 


POC Glucose (mg/dL)   


 


Random Glucose   144 H 


 


Calcium   9.3 


 


Phosphorus   2.4 L 


 


Magnesium   1.6 


 


Total Bilirubin   0.6 


 


AST   45 H D 


 


ALT   30 


 


Alkaline Phosphatase   82 


 


Total Protein   7.5 


 


Albumin   4.0 


 


Globulin   3.5 


 


Albumin/Globulin Ratio   1.1 


 


Venous Blood Potassium   


 


Urine Color   


 


Urine Clarity   


 


Urine pH   


 


Ur Specific Gravity   


 


Urine Protein   


 


Urine Glucose (UA)   


 


Urine Ketones   


 


Urine Blood   


 


Urine Nitrate   


 


Urine Bilirubin   


 


Urine Urobilinogen   


 


Ur Leukocyte Esterase   


 


Urine RBC (Auto)   


 


Urine Microscopic WBC   


 


Urine Bacteria   


 


Fluid Type   


 


CSF Volume   


 


CSF Appearance   


 


CSF WBC   


 


CSF RBC   


 


CSF Total Cell Counted   


 


CSF Neutrophils   


 


CSF Lymphocytes   


 


CSF Monos/Macrophages   


 


CSF Comment   


 


CSF Glucose   


 


CSF Total Protein   


 


HSV Source Description   














  07/10/18 07/10/18 07/10/18





  11:15 11:22 11:35


 


WBC   


 


RBC   


 


Hgb   


 


Hct   


 


MCV   


 


MCH   


 


MCHC   


 


RDW   


 


Plt Count   


 


MPV   


 


Neut % (Auto)   


 


Lymph % (Auto)   


 


Mono % (Auto)   


 


Eos % (Auto)   


 


Baso % (Auto)   


 


Neut # (Auto)   


 


Lymph # (Auto)   


 


Mono # (Auto)   


 


Eos # (Auto)   


 


Baso # (Auto)   


 


Neutrophils % (Manual)   


 


Band Neutrophils %   


 


Lymphocytes % (Manual)   


 


Monocytes % (Manual)   


 


Eosinophils % (Manual)   


 


Platelet Estimate   


 


RBC Morphology   


 


PT   


 


INR   


 


APTT   


 


D-Dimer, Quantitative   


 


pO2  46  


 


VBG pH  7.44 H  


 


VBG pCO2  43  


 


VBG HCO3  28.0  


 


VBG Total CO2  30.5 H  


 


VBG O2 Sat (Calc)  89.0 H  


 


VBG Base Excess  4.4 H  


 


VBG Potassium  3.4 L  


 


Glucose  151 H  


 


Lactate  1.9  


 


FiO2  21.0  


 


Sodium  135.0  


 


Potassium   


 


Chloride  102.0  


 


Carbon Dioxide   


 


Anion Gap   


 


BUN   


 


Creatinine   


 


Est GFR ( Amer)   


 


Est GFR (Non-Af Amer)   


 


POC Glucose (mg/dL)   139 H 


 


Random Glucose   


 


Calcium   


 


Phosphorus   


 


Magnesium   


 


Total Bilirubin   


 


AST   


 


ALT   


 


Alkaline Phosphatase   


 


Total Protein   


 


Albumin   


 


Globulin   


 


Albumin/Globulin Ratio   


 


Venous Blood Potassium  3.4 L  


 


Urine Color    Yellow


 


Urine Clarity    Slighty-cloudy


 


Urine pH    7.0


 


Ur Specific Gravity    1.016


 


Urine Protein    Negative


 


Urine Glucose (UA)    50


 


Urine Ketones    Negative


 


Urine Blood    Negative


 


Urine Nitrate    Negative


 


Urine Bilirubin    Negative


 


Urine Urobilinogen    0.2-1.0


 


Ur Leukocyte Esterase    Neg


 


Urine RBC (Auto)    3


 


Urine Microscopic WBC    < 1


 


Urine Bacteria    Rare


 


Fluid Type   


 


CSF Volume   


 


CSF Appearance   


 


CSF WBC   


 


CSF RBC   


 


CSF Total Cell Counted   


 


CSF Neutrophils   


 


CSF Lymphocytes   


 


CSF Monos/Macrophages   


 


CSF Comment   


 


CSF Glucose   


 


CSF Total Protein   


 


HSV Source Description   














  07/10/18 07/10/18 07/10/18





  16:00 16:00 16:00


 


WBC   


 


RBC   


 


Hgb   


 


Hct   


 


MCV   


 


MCH   


 


MCHC   


 


RDW   


 


Plt Count   


 


MPV   


 


Neut % (Auto)   


 


Lymph % (Auto)   


 


Mono % (Auto)   


 


Eos % (Auto)   


 


Baso % (Auto)   


 


Neut # (Auto)   


 


Lymph # (Auto)   


 


Mono # (Auto)   


 


Eos # (Auto)   


 


Baso # (Auto)   


 


Neutrophils % (Manual)   


 


Band Neutrophils %   


 


Lymphocytes % (Manual)   


 


Monocytes % (Manual)   


 


Eosinophils % (Manual)   


 


Platelet Estimate   


 


RBC Morphology   


 


PT   


 


INR   


 


APTT   


 


D-Dimer, Quantitative   


 


pO2   


 


VBG pH   


 


VBG pCO2   


 


VBG HCO3   


 


VBG Total CO2   


 


VBG O2 Sat (Calc)   


 


VBG Base Excess   


 


VBG Potassium   


 


Glucose   


 


Lactate   


 


FiO2   


 


Sodium   


 


Potassium   


 


Chloride   


 


Carbon Dioxide   


 


Anion Gap   


 


BUN   


 


Creatinine   


 


Est GFR ( Amer)   


 


Est GFR (Non-Af Amer)   


 


POC Glucose (mg/dL)   


 


Random Glucose   


 


Calcium   


 


Phosphorus   


 


Magnesium   


 


Total Bilirubin   


 


AST   


 


ALT   


 


Alkaline Phosphatase   


 


Total Protein   


 


Albumin   


 


Globulin   


 


Albumin/Globulin Ratio   


 


Venous Blood Potassium   


 


Urine Color   


 


Urine Clarity   


 


Urine pH   


 


Ur Specific Gravity   


 


Urine Protein   


 


Urine Glucose (UA)   


 


Urine Ketones   


 


Urine Blood   


 


Urine Nitrate   


 


Urine Bilirubin   


 


Urine Urobilinogen   


 


Ur Leukocyte Esterase   


 


Urine RBC (Auto)   


 


Urine Microscopic WBC   


 


Urine Bacteria   


 


Fluid Type  Spinal fluid  


 


CSF Volume  1  


 


CSF Appearance  Clear/colorless  


 


CSF WBC  1.0  


 


CSF RBC  0.0  


 


CSF Total Cell Counted  22 H  


 


CSF Neutrophils  1 H  


 


CSF Lymphocytes  16.0 H  


 


CSF Monos/Macrophages  1 H  


 


CSF Comment  Clear  


 


CSF Glucose   74 H 


 


CSF Total Protein   


 


HSV Source Description    Fluid














  07/10/18 07/10/18 07/10/18





  16:00 17:34 21:20


 


WBC   


 


RBC   


 


Hgb   


 


Hct   


 


MCV   


 


MCH   


 


MCHC   


 


RDW   


 


Plt Count   


 


MPV   


 


Neut % (Auto)   


 


Lymph % (Auto)   


 


Mono % (Auto)   


 


Eos % (Auto)   


 


Baso % (Auto)   


 


Neut # (Auto)   


 


Lymph # (Auto)   


 


Mono # (Auto)   


 


Eos # (Auto)   


 


Baso # (Auto)   


 


Neutrophils % (Manual)   


 


Band Neutrophils %   


 


Lymphocytes % (Manual)   


 


Monocytes % (Manual)   


 


Eosinophils % (Manual)   


 


Platelet Estimate   


 


RBC Morphology   


 


PT   


 


INR   


 


APTT   


 


D-Dimer, Quantitative   


 


pO2   


 


VBG pH   


 


VBG pCO2   


 


VBG HCO3   


 


VBG Total CO2   


 


VBG O2 Sat (Calc)   


 


VBG Base Excess   


 


VBG Potassium   


 


Glucose   


 


Lactate   


 


FiO2   


 


Sodium   


 


Potassium   


 


Chloride   


 


Carbon Dioxide   


 


Anion Gap   


 


BUN   


 


Creatinine   


 


Est GFR ( Amer)   


 


Est GFR (Non-Af Amer)   


 


POC Glucose (mg/dL)   149 H  256 H


 


Random Glucose   


 


Calcium   


 


Phosphorus   


 


Magnesium   


 


Total Bilirubin   


 


AST   


 


ALT   


 


Alkaline Phosphatase   


 


Total Protein   


 


Albumin   


 


Globulin   


 


Albumin/Globulin Ratio   


 


Venous Blood Potassium   


 


Urine Color   


 


Urine Clarity   


 


Urine pH   


 


Ur Specific Gravity   


 


Urine Protein   


 


Urine Glucose (UA)   


 


Urine Ketones   


 


Urine Blood   


 


Urine Nitrate   


 


Urine Bilirubin   


 


Urine Urobilinogen   


 


Ur Leukocyte Esterase   


 


Urine RBC (Auto)   


 


Urine Microscopic WBC   


 


Urine Bacteria   


 


Fluid Type   


 


CSF Volume   


 


CSF Appearance   


 


CSF WBC   


 


CSF RBC   


 


CSF Total Cell Counted   


 


CSF Neutrophils   


 


CSF Lymphocytes   


 


CSF Monos/Macrophages   


 


CSF Comment   


 


CSF Glucose   


 


CSF Total Protein  55.0  


 


HSV Source Description   














  07/10/18 07/11/18 07/11/18





  21:39 05:31 06:40


 


WBC    18.2 H


 


RBC    3.79 L


 


Hgb    12.0


 


Hct    36.1


 


MCV    95.1


 


MCH    31.8 H


 


MCHC    33.4


 


RDW    13.4


 


Plt Count    234


 


MPV    8.9


 


Neut % (Auto)    96.0 H


 


Lymph % (Auto)    2.6 L


 


Mono % (Auto)    1.3


 


Eos % (Auto)    0.0


 


Baso % (Auto)    0.1


 


Neut # (Auto)    17.5 H


 


Lymph # (Auto)    0.5 L


 


Mono # (Auto)    0.2


 


Eos # (Auto)    0.0


 


Baso # (Auto)    0.0


 


Neutrophils % (Manual)    94 H


 


Band Neutrophils %    2


 


Lymphocytes % (Manual)    3 L


 


Monocytes % (Manual)    1


 


Eosinophils % (Manual)   


 


Platelet Estimate    Normal


 


RBC Morphology    Normal


 


PT   


 


INR   


 


APTT   


 


D-Dimer, Quantitative  172  


 


pO2   


 


VBG pH   


 


VBG pCO2   


 


VBG HCO3   


 


VBG Total CO2   


 


VBG O2 Sat (Calc)   


 


VBG Base Excess   


 


VBG Potassium   


 


Glucose   


 


Lactate   


 


FiO2   


 


Sodium   


 


Potassium   


 


Chloride   


 


Carbon Dioxide   


 


Anion Gap   


 


BUN   


 


Creatinine   


 


Est GFR ( Amer)   


 


Est GFR (Non-Af Amer)   


 


POC Glucose (mg/dL)   200 H 


 


Random Glucose   


 


Calcium   


 


Phosphorus   


 


Magnesium   


 


Total Bilirubin   


 


AST   


 


ALT   


 


Alkaline Phosphatase   


 


Total Protein   


 


Albumin   


 


Globulin   


 


Albumin/Globulin Ratio   


 


Venous Blood Potassium   


 


Urine Color   


 


Urine Clarity   


 


Urine pH   


 


Ur Specific Gravity   


 


Urine Protein   


 


Urine Glucose (UA)   


 


Urine Ketones   


 


Urine Blood   


 


Urine Nitrate   


 


Urine Bilirubin   


 


Urine Urobilinogen   


 


Ur Leukocyte Esterase   


 


Urine RBC (Auto)   


 


Urine Microscopic WBC   


 


Urine Bacteria   


 


Fluid Type   


 


CSF Volume   


 


CSF Appearance   


 


CSF WBC   


 


CSF RBC   


 


CSF Total Cell Counted   


 


CSF Neutrophils   


 


CSF Lymphocytes   


 


CSF Monos/Macrophages   


 


CSF Comment   


 


CSF Glucose   


 


CSF Total Protein   


 


HSV Source Description   














  07/11/18 07/11/18





  06:40 10:53


 


WBC  


 


RBC  


 


Hgb  


 


Hct  


 


MCV  


 


MCH  


 


MCHC  


 


RDW  


 


Plt Count  


 


MPV  


 


Neut % (Auto)  


 


Lymph % (Auto)  


 


Mono % (Auto)  


 


Eos % (Auto)  


 


Baso % (Auto)  


 


Neut # (Auto)  


 


Lymph # (Auto)  


 


Mono # (Auto)  


 


Eos # (Auto)  


 


Baso # (Auto)  


 


Neutrophils % (Manual)  


 


Band Neutrophils %  


 


Lymphocytes % (Manual)  


 


Monocytes % (Manual)  


 


Eosinophils % (Manual)  


 


Platelet Estimate  


 


RBC Morphology  


 


PT  


 


INR  


 


APTT  


 


D-Dimer, Quantitative  


 


pO2  


 


VBG pH  


 


VBG pCO2  


 


VBG HCO3  


 


VBG Total CO2  


 


VBG O2 Sat (Calc)  


 


VBG Base Excess  


 


VBG Potassium  


 


Glucose  


 


Lactate  


 


FiO2  


 


Sodium  140 


 


Potassium  4.1 


 


Chloride  106 


 


Carbon Dioxide  24 


 


Anion Gap  14 


 


BUN  18 H 


 


Creatinine  0.6 L 


 


Est GFR ( Amer)  > 60 


 


Est GFR (Non-Af Amer)  > 60 


 


POC Glucose (mg/dL)   270 H


 


Random Glucose  211 H 


 


Calcium  9.0 


 


Phosphorus  


 


Magnesium  


 


Total Bilirubin  


 


AST  


 


ALT  


 


Alkaline Phosphatase  


 


Total Protein  


 


Albumin  


 


Globulin  


 


Albumin/Globulin Ratio  


 


Venous Blood Potassium  


 


Urine Color  


 


Urine Clarity  


 


Urine pH  


 


Ur Specific Gravity  


 


Urine Protein  


 


Urine Glucose (UA)  


 


Urine Ketones  


 


Urine Blood  


 


Urine Nitrate  


 


Urine Bilirubin  


 


Urine Urobilinogen  


 


Ur Leukocyte Esterase  


 


Urine RBC (Auto)  


 


Urine Microscopic WBC  


 


Urine Bacteria  


 


Fluid Type  


 


CSF Volume  


 


CSF Appearance  


 


CSF WBC  


 


CSF RBC  


 


CSF Total Cell Counted  


 


CSF Neutrophils  


 


CSF Lymphocytes  


 


CSF Monos/Macrophages  


 


CSF Comment  


 


CSF Glucose  


 


CSF Total Protein  


 


HSV Source Description  








 Laboratory Results - last 72 hr











  07/10/18 07/10/18 07/10/18





  11:05 11:05 11:05


 


WBC  19.4 H D  


 


RBC  3.93  


 


Hgb  12.4  


 


Hct  37.3  


 


MCV  94.9  


 


MCH  31.6 H  


 


MCHC  33.3  


 


RDW  13.3  


 


Plt Count  222  


 


MPV  8.6  


 


Neut % (Auto)  91.3 H  


 


Lymph % (Auto)  3.3 L  


 


Mono % (Auto)  4.5  


 


Eos % (Auto)  0.5  


 


Baso % (Auto)  0.4  


 


Neut # (Auto)  17.7 H  


 


Lymph # (Auto)  0.6 L  


 


Mono # (Auto)  0.9 H  


 


Eos # (Auto)  0.1  


 


Baso # (Auto)  0.1  


 


Neutrophils % (Manual)  90 H  


 


Band Neutrophils %  3 H  


 


Lymphocytes % (Manual)  2 L  


 


Monocytes % (Manual)  4  


 


Eosinophils % (Manual)  1  


 


Platelet Estimate  Normal  


 


RBC Morphology  Normal  


 


PT    11.5


 


INR    1.0


 


APTT    32.1


 


D-Dimer, Quantitative   


 


pO2   


 


VBG pH   


 


VBG pCO2   


 


VBG HCO3   


 


VBG Total CO2   


 


VBG O2 Sat (Calc)   


 


VBG Base Excess   


 


VBG Potassium   


 


Glucose   


 


Lactate   


 


FiO2   


 


Sodium   139 


 


Potassium   3.7 


 


Chloride   102 


 


Carbon Dioxide   26 


 


Anion Gap   15 


 


BUN   17 


 


Creatinine   0.6 L 


 


Est GFR ( Amer)   > 60 


 


Est GFR (Non-Af Amer)   > 60 


 


POC Glucose (mg/dL)   


 


Random Glucose   144 H 


 


Hemoglobin A1c   


 


Calcium   9.3 


 


Phosphorus   2.4 L 


 


Magnesium   1.6 


 


Total Bilirubin   0.6 


 


AST   45 H D 


 


ALT   30 


 


Alkaline Phosphatase   82 


 


Total Protein   7.5 


 


Albumin   4.0 


 


Globulin   3.5 


 


Albumin/Globulin Ratio   1.1 


 


Venous Blood Potassium   


 


Urine Color   


 


Urine Clarity   


 


Urine pH   


 


Ur Specific Gravity   


 


Urine Protein   


 


Urine Glucose (UA)   


 


Urine Ketones   


 


Urine Blood   


 


Urine Nitrate   


 


Urine Bilirubin   


 


Urine Urobilinogen   


 


Ur Leukocyte Esterase   


 


Urine RBC (Auto)   


 


Urine Microscopic WBC   


 


Urine Bacteria   


 


Fluid Type   


 


CSF Volume   


 


CSF Appearance   


 


CSF WBC   


 


CSF RBC   


 


CSF Total Cell Counted   


 


CSF Neutrophils   


 


CSF Lymphocytes   


 


CSF Monos/Macrophages   


 


CSF Comment   


 


CSF Glucose   


 


CSF Total Protein   


 


HSV Source Description   














  07/10/18 07/10/18 07/10/18





  11:15 11:22 11:35


 


WBC   


 


RBC   


 


Hgb   


 


Hct   


 


MCV   


 


MCH   


 


MCHC   


 


RDW   


 


Plt Count   


 


MPV   


 


Neut % (Auto)   


 


Lymph % (Auto)   


 


Mono % (Auto)   


 


Eos % (Auto)   


 


Baso % (Auto)   


 


Neut # (Auto)   


 


Lymph # (Auto)   


 


Mono # (Auto)   


 


Eos # (Auto)   


 


Baso # (Auto)   


 


Neutrophils % (Manual)   


 


Band Neutrophils %   


 


Lymphocytes % (Manual)   


 


Monocytes % (Manual)   


 


Eosinophils % (Manual)   


 


Platelet Estimate   


 


RBC Morphology   


 


PT   


 


INR   


 


APTT   


 


D-Dimer, Quantitative   


 


pO2  46  


 


VBG pH  7.44 H  


 


VBG pCO2  43  


 


VBG HCO3  28.0  


 


VBG Total CO2  30.5 H  


 


VBG O2 Sat (Calc)  89.0 H  


 


VBG Base Excess  4.4 H  


 


VBG Potassium  3.4 L  


 


Glucose  151 H  


 


Lactate  1.9  


 


FiO2  21.0  


 


Sodium  135.0  


 


Potassium   


 


Chloride  102.0  


 


Carbon Dioxide   


 


Anion Gap   


 


BUN   


 


Creatinine   


 


Est GFR ( Amer)   


 


Est GFR (Non-Af Amer)   


 


POC Glucose (mg/dL)   139 H 


 


Random Glucose   


 


Hemoglobin A1c   


 


Calcium   


 


Phosphorus   


 


Magnesium   


 


Total Bilirubin   


 


AST   


 


ALT   


 


Alkaline Phosphatase   


 


Total Protein   


 


Albumin   


 


Globulin   


 


Albumin/Globulin Ratio   


 


Venous Blood Potassium  3.4 L  


 


Urine Color    Yellow


 


Urine Clarity    Slighty-cloudy


 


Urine pH    7.0


 


Ur Specific Gravity    1.016


 


Urine Protein    Negative


 


Urine Glucose (UA)    50


 


Urine Ketones    Negative


 


Urine Blood    Negative


 


Urine Nitrate    Negative


 


Urine Bilirubin    Negative


 


Urine Urobilinogen    0.2-1.0


 


Ur Leukocyte Esterase    Neg


 


Urine RBC (Auto)    3


 


Urine Microscopic WBC    < 1


 


Urine Bacteria    Rare


 


Fluid Type   


 


CSF Volume   


 


CSF Appearance   


 


CSF WBC   


 


CSF RBC   


 


CSF Total Cell Counted   


 


CSF Neutrophils   


 


CSF Lymphocytes   


 


CSF Monos/Macrophages   


 


CSF Comment   


 


CSF Glucose   


 


CSF Total Protein   


 


HSV Source Description   














  07/10/18 07/10/18 07/10/18





  16:00 16:00 16:00


 


WBC   


 


RBC   


 


Hgb   


 


Hct   


 


MCV   


 


MCH   


 


MCHC   


 


RDW   


 


Plt Count   


 


MPV   


 


Neut % (Auto)   


 


Lymph % (Auto)   


 


Mono % (Auto)   


 


Eos % (Auto)   


 


Baso % (Auto)   


 


Neut # (Auto)   


 


Lymph # (Auto)   


 


Mono # (Auto)   


 


Eos # (Auto)   


 


Baso # (Auto)   


 


Neutrophils % (Manual)   


 


Band Neutrophils %   


 


Lymphocytes % (Manual)   


 


Monocytes % (Manual)   


 


Eosinophils % (Manual)   


 


Platelet Estimate   


 


RBC Morphology   


 


PT   


 


INR   


 


APTT   


 


D-Dimer, Quantitative   


 


pO2   


 


VBG pH   


 


VBG pCO2   


 


VBG HCO3   


 


VBG Total CO2   


 


VBG O2 Sat (Calc)   


 


VBG Base Excess   


 


VBG Potassium   


 


Glucose   


 


Lactate   


 


FiO2   


 


Sodium   


 


Potassium   


 


Chloride   


 


Carbon Dioxide   


 


Anion Gap   


 


BUN   


 


Creatinine   


 


Est GFR ( Amer)   


 


Est GFR (Non-Af Amer)   


 


POC Glucose (mg/dL)   


 


Random Glucose   


 


Hemoglobin A1c   


 


Calcium   


 


Phosphorus   


 


Magnesium   


 


Total Bilirubin   


 


AST   


 


ALT   


 


Alkaline Phosphatase   


 


Total Protein   


 


Albumin   


 


Globulin   


 


Albumin/Globulin Ratio   


 


Venous Blood Potassium   


 


Urine Color   


 


Urine Clarity   


 


Urine pH   


 


Ur Specific Gravity   


 


Urine Protein   


 


Urine Glucose (UA)   


 


Urine Ketones   


 


Urine Blood   


 


Urine Nitrate   


 


Urine Bilirubin   


 


Urine Urobilinogen   


 


Ur Leukocyte Esterase   


 


Urine RBC (Auto)   


 


Urine Microscopic WBC   


 


Urine Bacteria   


 


Fluid Type  Spinal fluid  


 


CSF Volume  1  


 


CSF Appearance  Clear/colorless  


 


CSF WBC  1.0  


 


CSF RBC  0.0  


 


CSF Total Cell Counted  22 H  


 


CSF Neutrophils  1 H  


 


CSF Lymphocytes  16.0 H  


 


CSF Monos/Macrophages  1 H  


 


CSF Comment  Clear  


 


CSF Glucose   74 H 


 


CSF Total Protein   


 


HSV Source Description    Fluid














  07/10/18 07/10/18 07/10/18





  16:00 17:34 21:20


 


WBC   


 


RBC   


 


Hgb   


 


Hct   


 


MCV   


 


MCH   


 


MCHC   


 


RDW   


 


Plt Count   


 


MPV   


 


Neut % (Auto)   


 


Lymph % (Auto)   


 


Mono % (Auto)   


 


Eos % (Auto)   


 


Baso % (Auto)   


 


Neut # (Auto)   


 


Lymph # (Auto)   


 


Mono # (Auto)   


 


Eos # (Auto)   


 


Baso # (Auto)   


 


Neutrophils % (Manual)   


 


Band Neutrophils %   


 


Lymphocytes % (Manual)   


 


Monocytes % (Manual)   


 


Eosinophils % (Manual)   


 


Platelet Estimate   


 


RBC Morphology   


 


PT   


 


INR   


 


APTT   


 


D-Dimer, Quantitative   


 


pO2   


 


VBG pH   


 


VBG pCO2   


 


VBG HCO3   


 


VBG Total CO2   


 


VBG O2 Sat (Calc)   


 


VBG Base Excess   


 


VBG Potassium   


 


Glucose   


 


Lactate   


 


FiO2   


 


Sodium   


 


Potassium   


 


Chloride   


 


Carbon Dioxide   


 


Anion Gap   


 


BUN   


 


Creatinine   


 


Est GFR ( Amer)   


 


Est GFR (Non-Af Amer)   


 


POC Glucose (mg/dL)   149 H  256 H


 


Random Glucose   


 


Hemoglobin A1c   


 


Calcium   


 


Phosphorus   


 


Magnesium   


 


Total Bilirubin   


 


AST   


 


ALT   


 


Alkaline Phosphatase   


 


Total Protein   


 


Albumin   


 


Globulin   


 


Albumin/Globulin Ratio   


 


Venous Blood Potassium   


 


Urine Color   


 


Urine Clarity   


 


Urine pH   


 


Ur Specific Gravity   


 


Urine Protein   


 


Urine Glucose (UA)   


 


Urine Ketones   


 


Urine Blood   


 


Urine Nitrate   


 


Urine Bilirubin   


 


Urine Urobilinogen   


 


Ur Leukocyte Esterase   


 


Urine RBC (Auto)   


 


Urine Microscopic WBC   


 


Urine Bacteria   


 


Fluid Type   


 


CSF Volume   


 


CSF Appearance   


 


CSF WBC   


 


CSF RBC   


 


CSF Total Cell Counted   


 


CSF Neutrophils   


 


CSF Lymphocytes   


 


CSF Monos/Macrophages   


 


CSF Comment   


 


CSF Glucose   


 


CSF Total Protein  55.0  


 


HSV Source Description   














  07/10/18 07/11/18 07/11/18





  21:39 05:31 06:40


 


WBC    18.2 H


 


RBC    3.79 L


 


Hgb    12.0


 


Hct    36.1


 


MCV    95.1


 


MCH    31.8 H


 


MCHC    33.4


 


RDW    13.4


 


Plt Count    234


 


MPV    8.9


 


Neut % (Auto)    96.0 H


 


Lymph % (Auto)    2.6 L


 


Mono % (Auto)    1.3


 


Eos % (Auto)    0.0


 


Baso % (Auto)    0.1


 


Neut # (Auto)    17.5 H


 


Lymph # (Auto)    0.5 L


 


Mono # (Auto)    0.2


 


Eos # (Auto)    0.0


 


Baso # (Auto)    0.0


 


Neutrophils % (Manual)    94 H


 


Band Neutrophils %    2


 


Lymphocytes % (Manual)    3 L


 


Monocytes % (Manual)    1


 


Eosinophils % (Manual)   


 


Platelet Estimate    Normal


 


RBC Morphology    Normal


 


PT   


 


INR   


 


APTT   


 


D-Dimer, Quantitative  172  


 


pO2   


 


VBG pH   


 


VBG pCO2   


 


VBG HCO3   


 


VBG Total CO2   


 


VBG O2 Sat (Calc)   


 


VBG Base Excess   


 


VBG Potassium   


 


Glucose   


 


Lactate   


 


FiO2   


 


Sodium   


 


Potassium   


 


Chloride   


 


Carbon Dioxide   


 


Anion Gap   


 


BUN   


 


Creatinine   


 


Est GFR ( Amer)   


 


Est GFR (Non-Af Amer)   


 


POC Glucose (mg/dL)   200 H 


 


Random Glucose   


 


Hemoglobin A1c   


 


Calcium   


 


Phosphorus   


 


Magnesium   


 


Total Bilirubin   


 


AST   


 


ALT   


 


Alkaline Phosphatase   


 


Total Protein   


 


Albumin   


 


Globulin   


 


Albumin/Globulin Ratio   


 


Venous Blood Potassium   


 


Urine Color   


 


Urine Clarity   


 


Urine pH   


 


Ur Specific Gravity   


 


Urine Protein   


 


Urine Glucose (UA)   


 


Urine Ketones   


 


Urine Blood   


 


Urine Nitrate   


 


Urine Bilirubin   


 


Urine Urobilinogen   


 


Ur Leukocyte Esterase   


 


Urine RBC (Auto)   


 


Urine Microscopic WBC   


 


Urine Bacteria   


 


Fluid Type   


 


CSF Volume   


 


CSF Appearance   


 


CSF WBC   


 


CSF RBC   


 


CSF Total Cell Counted   


 


CSF Neutrophils   


 


CSF Lymphocytes   


 


CSF Monos/Macrophages   


 


CSF Comment   


 


CSF Glucose   


 


CSF Total Protein   


 


HSV Source Description   














  07/11/18 07/11/18 07/11/18





  06:40 06:40 10:53


 


WBC   


 


RBC   


 


Hgb   


 


Hct   


 


MCV   


 


MCH   


 


MCHC   


 


RDW   


 


Plt Count   


 


MPV   


 


Neut % (Auto)   


 


Lymph % (Auto)   


 


Mono % (Auto)   


 


Eos % (Auto)   


 


Baso % (Auto)   


 


Neut # (Auto)   


 


Lymph # (Auto)   


 


Mono # (Auto)   


 


Eos # (Auto)   


 


Baso # (Auto)   


 


Neutrophils % (Manual)   


 


Band Neutrophils %   


 


Lymphocytes % (Manual)   


 


Monocytes % (Manual)   


 


Eosinophils % (Manual)   


 


Platelet Estimate   


 


RBC Morphology   


 


PT   


 


INR   


 


APTT   


 


D-Dimer, Quantitative   


 


pO2   


 


VBG pH   


 


VBG pCO2   


 


VBG HCO3   


 


VBG Total CO2   


 


VBG O2 Sat (Calc)   


 


VBG Base Excess   


 


VBG Potassium   


 


Glucose   


 


Lactate   


 


FiO2   


 


Sodium  140  


 


Potassium  4.1  


 


Chloride  106  


 


Carbon Dioxide  24  


 


Anion Gap  14  


 


BUN  18 H  


 


Creatinine  0.6 L  


 


Est GFR ( Amer)  > 60  


 


Est GFR (Non-Af Amer)  > 60  


 


POC Glucose (mg/dL)    270 H


 


Random Glucose  211 H  


 


Hemoglobin A1c   7.3 H D 


 


Calcium  9.0  


 


Phosphorus   


 


Magnesium   


 


Total Bilirubin   


 


AST   


 


ALT   


 


Alkaline Phosphatase   


 


Total Protein   


 


Albumin   


 


Globulin   


 


Albumin/Globulin Ratio   


 


Venous Blood Potassium   


 


Urine Color   


 


Urine Clarity   


 


Urine pH   


 


Ur Specific Gravity   


 


Urine Protein   


 


Urine Glucose (UA)   


 


Urine Ketones   


 


Urine Blood   


 


Urine Nitrate   


 


Urine Bilirubin   


 


Urine Urobilinogen   


 


Ur Leukocyte Esterase   


 


Urine RBC (Auto)   


 


Urine Microscopic WBC   


 


Urine Bacteria   


 


Fluid Type   


 


CSF Volume   


 


CSF Appearance   


 


CSF WBC   


 


CSF RBC   


 


CSF Total Cell Counted   


 


CSF Neutrophils   


 


CSF Lymphocytes   


 


CSF Monos/Macrophages   


 


CSF Comment   


 


CSF Glucose   


 


CSF Total Protein   


 


HSV Source Description   








 Microbiology





07/10/18 16:00   Cerebral Spinal Fluid   Gram Stain - Final


07/10/18 16:00   Cerebral Spinal Fluid   CSF Culture - Preliminary


                            NO GROWTH AFTER 24 HOURS


07/10/18 11:35   Urine,Clean Catch   Urine Culture - Final


                            10-50,000 CFU/ML.


                            MULTIPLE SPECIES. PROBABLE CONTAMINATION.


07/10/18 11:05   Blood   Blood Culture - Preliminary


                            NO GROWTH AFTER 24 HOURS





 )


 Accession No. : [id number]


Patient Name / ID : GISELE JAIMES  / [id number]


Exam Date : 07/10/2018 18:07:13 ( Approved )


Study Comment : 


Sex / Age : F  / 73Y





Creator : Asotn Samayoa MD


Dictator : Aston Samayoa MD


 : 


Approver : Aston Samayoa MD


Approver2 : 





Report Date : 07/10/2018 18:46:06


My Comment : 


********************************************************************************

***





Date of service: 





07/10/2018





PROCEDURE:  Right lower extremity venous duplex Doppler. 





HISTORY:


pain on right thigh











COMPARISON:


None available.





TECHNIQUE:


Common femoral, superficial femoral, popliteal and posterior tibial veins were 

evaluated. Flow was assessed with color Doppler, compressibility, assessment of 

phasic flow and augmentation response. 





FINDINGS:





COMMON FEMORAL VEIN:


Unremarkable.





SUPERFICIAL FEMORAL VEIN:


Unremarkable.





POPLITEAL VEIN:


Unremarkable.





POSTERIOR TIBIAL VEIN:


Unremarkable.





OTHER FINDINGS:


None.





IMPRESSION:


No evidence of deep venous thrombosis in the right lower extremity.














Accession No. : [id number]


Patient Name / ID : GISELE JAIMES  / [id number]


Exam Date : 07/10/2018 20:05:34 ( Approved )


Study Comment : 


Sex / Age : F  / 73Y





Creator : barron barnett


Dictator : Caldwell-Lombardi, Kathleen


 : 


Approver : Caldwell-Lombardi, Kathleen


Approver2 : 





Report Date : 07/10/2018 20:24:03


My Comment : 


********************************************************************************

***





Date of service: 





07/10/2018





PROCEDURE:  





HISTORY:


pain on right thigh





COMPARISON:


None





TECHNIQUE:


AP and oblique lateral views obtained





FINDINGS:


No fracture or lytic lesion.





Right hip and right knee arthrosis present 





IMPRESSION:








No fracture or lytic lesion.





Right hip and right knee arthrosis present














Accession No. : [id number]


Patient Name / ID : GISELE JAIMES  / [id number]


Exam Date : 07/10/2018 11:52:11 ( Approved )


Study Comment : 


Sex / Age : F  / 73Y





Creator : Bhaskar Dang MD


Dictator : Bhaskar Dang MD


 : 


Approver : Bhaskar Dang MD


Approver2 : 





Report Date : 07/10/2018 12:32:27


My Comment : 


********************************************************************************

***





Date of service: 





07/10/2018





PROCEDURE:  CT HEAD WITHOUT CONTRAST.





HISTORY:


HA





COMPARISON:


Comparison made with CT scan brain dated 10/25/2014 





TECHNIQUE:


Axial computed tomography images were obtained through the head/brain without 

intravenous contrast.  





Radiation dose:





Total exam DLP = 1052.87 mGy-cm.





This CT exam was performed using one or more of the following dose reduction 

techniques: Automated exposure control, adjustment of the mA and/or kV 

according to patient size, and/or use of iterative reconstruction technique.





FINDINGS:





HEMORRHAGE:


No intracranial hemorrhage. 





BRAIN:


No evidence of large acute infarct.  Questionable few tiny chronic bilateral 

basal nuclei lacunar type infarcts. 





Ventricular and sulcal size are within range of normal for this patient's 

stated age 





VENTRICLES:


No obstructive hydrocephalus. 





CALVARIUM:


Unremarkable.





PARANASAL SINUSES:


Unremarkable as visualized. No significant inflammatory changes.





MASTOID AIR CELLS:


Unremarkable as visualized. No inflammatory changes.





OTHER FINDINGS:


None.





IMPRESSION:


No evidence of acute intracranial hemorrhage or infarct.  Questionable few 

chronic bilateral basal nuclei lacunar type inAccession No. : [id number]


Patient Name / ID : GISELE JAIMES  / [id number]


Exam Date : 07/10/2018 11:40:06 ( Approved )


Study Comment : 


Sex / Age : F  / 73Y





Creator : Bhaskar Dang MD


Dictator : Bhaskar Dang MD


 : 


Approver : Bhaskar Dang MD


Approver2 : 





Report Date : 07/10/2018 12:53:19


My Comment : 


********************************************************************************

***





Date of service: 





07/10/2018





PROCEDURE:  CT Abdomen and Pelvis with contrast





HISTORY:


LLQ pain, fever





COMPARISON:


None.





TECHNIQUE:


Contrast dose: 95 cc Omnipaque 300





Radiation dose:





Total exam DLP = 676.38 mGy-cm.





This CT exam was performed using one or more of the following dose reduction 

techniques: Automated exposure control, adjustment of the mA and/or kV 

according to patient size, and/or use of iterative reconstruction technique. . 





FINDINGS:





LOWER THORAX:


Minor bibasilar passive/dependent type atelectasis both lung bases. Minor 

scarring in the lingular and middle lobe regions as well. . No evidence of 

effusion or basilar pneumothorax. 





Heart size is enlarged. No significant pericardial effusion. 





Tiny hiatal hernia. 





LIVER:


Liver is upper limits of normal measuring over 18 cm in CC dimension. Minor 

fatty hepatic infiltration. No obvious hepatic mass collection or 

calcification.  Portal and splenic veins are opacified. 





GALLBLADDER AND BILE DUCTS:


Cholecystectomy 





PANCREAS:


Pancreas is atrophic fatty replaced. .  There is a well-circumscribed small 

elliptical shaped approximately 14 point 4 x 10.2 times 13 point 3 mm low-

attenuation lesion within the mid to distal body of the pancreas that exhibits 

Hounsfield units in the ranging between mid single digits.  There also appears 

to be a tiny calcification along the inferior margin of this low-attenuation 

lesion. . The lesion is of uncertain etiology though could represent a 

pseudocyst possibility of a cystic neoplasm not excluded. .  Followup the CT 

scan at interval could be performed to assess stability. Clinical correlation 

with GI consultation recommended for further evaluation. 





SPLEEN:


The spleen exhibits normal size and attenuation pattern.  No masses collections 

or calcifications. 





ADRENALS:


There are no adrenal lesions 





KIDNEYS AND URETERS:


Kidneys demonstrate symmetric nephrograms.  No evidence of nephrolithiasis or 

hydronephrosis.  No obvious renal mass or collection. 





VASCULATURE:


Unremarkable. No aortic aneurysm. 





BOWEL:


Evaluation of the bowel is limited due to the lack of oral contrast material. 





The stomach is incompletely distended which presumably accounts for thick-

walled appearance.  Visualized loops of small bowel exhibit normal contour and 

caliber. No evidence of acute mechanical small bowel obstruction.  There there 

is fecalized content within the distal small bowel suggesting stasis. Note made 

of a few loops of proximal small bowel left mid abdomen which exhibit 

questionable mild wall thickening.  Rule out enteritis. 





. 





Stool and air seen throughout the large bowel suggesting mild fecal retention. 

No definitive mural wall thickening of the colon. . 





APPENDIX:


What may represent a short but normal-appearing appendix seen on coronal image 

number [id number] and axial image number 55- 57. No periappendiceal inflammatory 

changes. 





PERITONEUM:


Unremarkable. No free fluid. No free air. Small fat containing bilateral 

inguinal hernias are present left slightly larger than right. 





LYMPH NODES:


Multiple small nonspecific retroperitoneal lymph nodes are noted. . 





BLADDER:


Urinary bladder is incompletely distended which in part accounts for thick-

walled appearance however cystitis must be excluded.  Correlation urinalysis. 





REPRODUCTIVE:


Uterus unremarkable aside from what probably represents some myometrial 

vascular calcifications. 





BONES:


Mild multilevel degenerative spondylosis of the lower thoracic and lumbar 

spine. 





No acute compression fractures no retropulsed fragments. 





OTHER FINDINGS:


None.





IMPRESSION:


Liver is upper limits of normal.  Mild fatty hepatic infiltration. 





Cholecystectomy. 





There is a small cystic lesion within the mid body of the pancreas of uncertain 

etiology. Rule out pseudocyst versus cystic neoplasm.  GI consultation 

suggested. 





Note made of a few loops of proximal small bowel left mid abdomen which exhibit 

questionable mild wall thickening.  Rule out enteritis. 





Findings consistent with mild fecal retention/constipation as described above. 





Urinary bladder wall thickening likely due to incomplete distention however 

correlation with urinalysis recommended to exclude the possibility of a cystitis











Accession No. : [id number]


Patient Name / ID : GISELE JAIMES  / [id number]


Exam Date : 07/10/2018 20:05:34 ( Approved )


Study Comment : 


Sex / Age : F  / 73Y





Creator : barron barnett


Dictator : 


 : 


Approver : Bhaskar Dang MD


Approver2 : 





Report Date : 07/10/2018 20:24:03


My Comment : 


********************************************************************************

***





Date of service: 





07/10/2018





HISTORY:


Fever  





COMPARISON:


Saint made with prior chest radiograph 07/10/2018. . 





TECHNIQUE:


Chest PA and lateral





FINDINGS:





LUNGS:


Mild atelectasis and/or scarring changes both lung bases left greater than 

right.





PLEURA:


No significant pleural effusion identified. No pneumothorax apparent.





CARDIOVASCULAR:


Normal.





OSSEOUS STRUCTURES:


No significant abnormalities.





VISUALIZED UPPER ABDOMEN:


Normal.





OTHER FINDINGS:


None.





IMPRESSION:


Mild atelectasis and/or scarring changes both lung bases left greater than 

right.











Assessment & Plan


(1) Fever with chills


Status: Acute   Priority: High   





(2) Leukocytosis


Status: Acute   





- Assessment and Plan (Free Text)


Assessment: 





A/P-


73 year old female with PMH of DM II, HTN was admitted with fever/chills and HA 

..





her symptoms of HA and chills have resolved.


CSF not c/w bacterial meningitis as only 1 wbc and normal glucose and so far 

CSF gram stain and cx are both negative.


source of leukocytoss unclear at this time perhaps pulm vs GI.


abd /pelvic ct reported as ? enteritis and ? pancreatic psudocyst or mass


cxr reported as b/l ower lung filed atelactasis.


UA- negative


urine cx- contaminant


csf gram stain and cx- neg to date





Plan-


check blood cx x 2.


check stool c.diff if any loose stools


check stool culture and ova and parasite.


advise to change antibiotics to IV zosyn to cover for ? enteritis and GI 

pathogens adn would also cover broadly for pulmonary pathogens.


d/c ceftriaxone.


d/c vancomycin.


can continue with empiric IV acyclovir pending CSF HSV PCR result.( low 

suspicion).


also had asked csf to be tested for lyme and VDRL .


check urine legionella AG as well.


Also advise GI evaluation for the pancreatic findings.





all above d/w patient and  she verbalizes full understanding of all above and 

agrees with above plan of care.





Thank you fro allowing me to take part in the care of this patient.

## 2018-07-11 NOTE — CP.PCM.PN
Subjective





- Date & Time of Evaluation


Date of Evaluation: 07/11/18


Time of Evaluation: 13:00





- Subjective


Subjective: 





Pt has no fever at present


denies headache


no chills


denies cough


no SOB


no chect pain


no abd pain


no diarrhea


complains of slight right thigh discomfort





Objective





- Vital Signs/Intake and Output


Vital Signs (last 24 hours): 


 











Temp Pulse Resp BP Pulse Ox


 


 97.9 F   101 H  20   110/62   94 L


 


 07/11/18 08:08  07/11/18 09:41  07/11/18 08:08  07/11/18 09:41  07/11/18 08:08











- Medications


Medications: 


 Current Medications





Acetaminophen (Tylenol 325mg Tab)  650 mg PO Q6 PRN


   PRN Reason: Pain, Mild (1-3)


Acetaminophen (Tylenol 325mg Tab)  650 mg PO Q6 PRN


   PRN Reason: Fever >100.4 F


Aspirin (Ecotrin)  81 mg PO DAILY [facility]


   Last Admin: 07/11/18 09:25 Dose:  81 mg


Atorvastatin Calcium (Lipitor)  20 mg PO DAILY [facility]


   Last Admin: 07/11/18 09:25 Dose:  20 mg


Docusate Sodium (Colace)  100 mg PO BID [facility]


   Last Admin: 07/11/18 09:24 Dose:  100 mg


Enoxaparin Sodium (Lovenox)  40 mg SC DAILY [facility]


   PRN Reason: Protocol


   Last Admin: 07/11/18 09:25 Dose:  40 mg


Sodium Chloride (Sodium Chloride 0.9%)  1,000 mls @ 100 mls/hr IV .Q10H [facility]


   Last Admin: 07/10/18 18:54 Dose:  100 mls/hr


Vancomycin HCl 1 gm/ Sodium (Chloride)  250 mls @ 166.667 mls/hr IVPB Q12 [facility]


   PRN Reason: Protocol


   Last Admin: 07/11/18 09:40 Dose:  166.667 mls/hr


Ceftriaxone Sodium 1 gm/ (Sodium Chloride)  100 mls @ 100 mls/hr IVPB DAILY [facility]


   PRN Reason: Protocol


   Last Admin: 07/11/18 09:27 Dose:  100 mls/hr


Acyclovir 500 mg/ Sodium (Chloride)  100 mls @ 100 mls/hr IVPB Q8 [facility]


   PRN Reason: Protocol


   Last Admin: 07/11/18 09:42 Dose:  100 mls/hr


Lisinopril (Zestril)  5 mg PO DAILY [facility]


   Last Admin: 07/11/18 09:41 Dose:  5 mg


Metformin HCl (Glucophage)  1,000 mg PO BID [facility]


   Last Admin: 07/11/18 09:25 Dose:  1,000 mg


Pantoprazole Sodium (Protonix Ec Tab)  40 mg PO DAILY [facility]


   Last Admin: 07/11/18 09:27 Dose:  40 mg











- Labs


Labs: 


 





 07/11/18 06:40 





 07/11/18 06:40 





 











PT  11.5 Seconds (9.8-13.1)   07/10/18  11:05    


 


INR  1.0  (0.9-1.2)   07/10/18  11:05    


 


APTT  32.1 Seconds (25.6-37.1)   07/10/18  11:05    














- Constitutional


Appears: Non-toxic, No Acute Distress





- Head Exam


Head Exam: ATRAUMATIC, NORMAL INSPECTION, NORMOCEPHALIC





- Eye Exam


Eye Exam: EOMI





- ENT Exam


ENT Exam: Mucous Membranes Moist, Normal External Ear Exam





- Neck Exam


Neck Exam: Full ROM.  absent: Meningismus





- Respiratory Exam


Respiratory Exam: NORMAL BREATHING PATTERN.  absent: Respiratory Distress





- Cardiovascular Exam


Cardiovascular Exam: REGULAR RHYTHM, +S1, +S2





- GI/Abdominal Exam


GI & Abdominal Exam: Soft, Normal Bowel Sounds.  absent: Tenderness





- Extremities Exam


Extremities Exam: Full ROM, Normal Capillary Refill.  absent: Calf Tenderness





- Back Exam


Back Exam: Full ROM.  absent: CVA tenderness (L), CVA tenderness (R)





- Neurological Exam


Neurological Exam: Alert, Awake, CN II-XII Intact, Oriented x3


Neuro motor strength exam: Left Upper Extremity: 5, Right Upper Extremity: 5, 

Left Lower Extremity: 5, Right Lower Extremity: 5





- Psychiatric Exam


Psychiatric exam: Normal Affect, Normal Mood





- Skin


Skin Exam: Dry, Normal Color, Warm





Assessment and Plan


(1) Fever with chills


Status: Acute   





(2) Leukocytosis


Status: Acute   





(3) Headache


Status: Acute   





(4) Pancreatic mass


Status: Chronic   





(5) DMII (diabetes mellitus, type 2)


Status: Chronic   





- Assessment and Plan (Free Text)


Assessment: 








74 y/o female with hx of DM type II, Pancreatic cyst , was brought in bec of 

fever , chills and headache.  In the ED found to have fever and leukocytosis.  

LP done showed WBC=22 with lymphocyte predominace.








(1) Fever with chills, Leukocytosis and Headache


Status: Acute   


? etiology


? Aseptic Meningitis


- check  HSV, Enterovirus


- pt had complained of some right lower ext pain prior to even 


- empirically started on IV Ceftriaxone, Vanco and Acyclovir


- Panculture- blood. CSF, Urine


- CXR showed Atelectasis , scarring- pt has no resp sxs.


- ID consult





(2) Pancreatic Cyst


Status: Chronic   


CT scan showed Pancreatic cyst - this has been present since 2014 , pt has had 

CT scan to ff up this cyst and was eval by GI, pt denies abd pain





(3) DMII (diabetes mellitus, type 2)


Status: Chronic


accucheck with coverage


cont Metformin





4. HTN


cont Lisinopril





5.  Right thigh pain


Doppler Us  ruled out DVT


Femur xray : no fracture





DVT proph


-Lovenox

## 2018-07-11 NOTE — RAD
Date of service: 



07/10/2018



PROCEDURE:  



HISTORY:

pain on right thigh



COMPARISON:

None



TECHNIQUE:

AP and oblique lateral views obtained



FINDINGS:

No fracture or lytic lesion.



Right hip and right knee arthrosis present 



IMPRESSION:





No fracture or lytic lesion.



Right hip and right knee arthrosis present

## 2018-07-11 NOTE — CARD
--------------- APPROVED REPORT --------------





Date of service: 07/10/2018



EKG Measurement

Heart [id number]

SC [id number]

[id number]

[id number]

[id number]



<Conclusion>

Normal sinus rhythm

Normal ECG

## 2018-07-11 NOTE — RAD
Date of service: 



07/10/2018



HISTORY:

Fever  



COMPARISON:

Saint made with prior chest radiograph 07/10/2018. . 



TECHNIQUE:

Chest PA and lateral



FINDINGS:



LUNGS:

Mild atelectasis and/or scarring changes both lung bases left greater 

than right.



PLEURA:

No significant pleural effusion identified. No pneumothorax apparent.



CARDIOVASCULAR:

Normal.



OSSEOUS STRUCTURES:

No significant abnormalities.



VISUALIZED UPPER ABDOMEN:

Normal.



OTHER FINDINGS:

None.



IMPRESSION:

Mild atelectasis and/or scarring changes both lung bases left greater 

than right.

## 2018-07-12 LAB
ALBUMIN SERPL-MCNC: 3 G/DL (ref 3.5–5)
ALBUMIN/GLOB SERPL: 1 {RATIO} (ref 1–2.1)
ALT SERPL-CCNC: 25 U/L (ref 9–52)
AST SERPL-CCNC: 17 U/L (ref 14–36)
BASOPHILS # BLD AUTO: 0 K/UL (ref 0–0.2)
BASOPHILS NFR BLD: 0.1 % (ref 0–2)
BUN SERPL-MCNC: 18 MG/DL (ref 7–17)
CALCIUM SERPL-MCNC: 9 MG/DL (ref 8.4–10.2)
EOSINOPHIL # BLD AUTO: 0.1 K/UL (ref 0–0.7)
EOSINOPHIL NFR BLD: 0.5 % (ref 0–4)
ERYTHROCYTE [DISTWIDTH] IN BLOOD BY AUTOMATED COUNT: 13.7 % (ref 11.5–14.5)
GFR NON-AFRICAN AMERICAN: > 60
HGB BLD-MCNC: 10.8 G/DL (ref 12–16)
LYMPHOCYTES # BLD AUTO: 1.6 K/UL (ref 1–4.3)
LYMPHOCYTES NFR BLD AUTO: 13.7 % (ref 20–40)
MCH RBC QN AUTO: 32.2 PG (ref 27–31)
MCHC RBC AUTO-ENTMCNC: 33.7 G/DL (ref 33–37)
MCV RBC AUTO: 95.7 FL (ref 81–99)
MONOCYTES # BLD: 0.8 K/UL (ref 0–0.8)
MONOCYTES NFR BLD: 7.1 % (ref 0–10)
NEUTROPHILS # BLD: 8.9 K/UL (ref 1.8–7)
NEUTROPHILS NFR BLD AUTO: 78.6 % (ref 50–75)
NRBC BLD AUTO-RTO: 0 % (ref 0–0)
PLATELET # BLD: 205 K/UL (ref 130–400)
PMV BLD AUTO: 8.9 FL (ref 7.2–11.7)
RBC # BLD AUTO: 3.34 MIL/UL (ref 3.8–5.2)
SPECIMEN SOURCE: (no result)
WBC # BLD AUTO: 11.3 K/UL (ref 4.8–10.8)

## 2018-07-12 RX ADMIN — WATER SCH MLS/HR: 1 INJECTION INTRAMUSCULAR; INTRAVENOUS; SUBCUTANEOUS at 03:39

## 2018-07-12 RX ADMIN — WATER SCH MLS/HR: 1 INJECTION INTRAMUSCULAR; INTRAVENOUS; SUBCUTANEOUS at 21:36

## 2018-07-12 RX ADMIN — PANTOPRAZOLE SODIUM SCH MG: 40 TABLET, DELAYED RELEASE ORAL at 09:26

## 2018-07-12 RX ADMIN — ENOXAPARIN SODIUM SCH MG: 40 INJECTION SUBCUTANEOUS at 09:25

## 2018-07-12 RX ADMIN — WATER SCH MLS/HR: 1 INJECTION INTRAMUSCULAR; INTRAVENOUS; SUBCUTANEOUS at 16:30

## 2018-07-12 RX ADMIN — WATER SCH MLS/HR: 1 INJECTION INTRAMUSCULAR; INTRAVENOUS; SUBCUTANEOUS at 09:28

## 2018-07-12 NOTE — CP.PCM.PN
Subjective





- Date & Time of Evaluation


Date of Evaluation: 07/12/18


Time of Evaluation: 12:00





- Subjective


Subjective: 





t has no fever


feels better


denies HA, no dizziness


no CP


no SOB


no cough


no abd pain


had one soft BM last night and once this am


no leg pain








Objective





- Vital Signs/Intake and Output


Vital Signs (last 24 hours): 


 











Temp Pulse Resp BP Pulse Ox


 


 98.3 F   86   18   110/60   95 


 


 07/12/18 08:53  07/12/18 09:32  07/12/18 08:53  07/12/18 09:32  07/12/18 08:53











- Medications


Medications: 


 Current Medications





Acetaminophen (Tylenol 325mg Tab)  650 mg PO Q6 PRN


   PRN Reason: Pain, Mild (1-3)


Acetaminophen (Tylenol 325mg Tab)  650 mg PO Q6 PRN


   PRN Reason: Fever >100.4 F


Aspirin (Ecotrin)  81 mg PO DAILY [facility]


   Last Admin: 07/12/18 09:24 Dose:  81 mg


Atorvastatin Calcium (Lipitor)  20 mg PO DAILY [facility]


   Last Admin: 07/12/18 09:24 Dose:  20 mg


Docusate Sodium (Colace)  100 mg PO BID [facility]


   Last Admin: 07/12/18 09:23 Dose:  Not Given


Enoxaparin Sodium (Lovenox)  40 mg SC DAILY [facility]


   PRN Reason: Protocol


   Last Admin: 07/12/18 09:25 Dose:  40 mg


Sodium Chloride (Sodium Chloride 0.9%)  1,000 mls @ 100 mls/hr IV .Q10H [facility]


   Last Admin: 07/10/18 18:54 Dose:  100 mls/hr


Acyclovir 500 mg/ Sodium (Chloride)  100 mls @ 100 mls/hr IVPB Q8 [facility]


   PRN Reason: Protocol


   Last Admin: 07/12/18 09:30 Dose:  100 mls/hr


Piperacillin Sod/Tazobactam (Sod 3.375 gm/ Sodium Chloride)  100 mls @ 100 mls/

hr IVPB Q6 [facility]


   PRN Reason: Protocol


   Last Admin: 07/12/18 09:28 Dose:  100 mls/hr


Lisinopril (Zestril)  5 mg PO DAILY [facility]


   Last Admin: 07/12/18 09:32 Dose:  5 mg


Metformin HCl (Glucophage)  1,000 mg PO BID [facility]


   Last Admin: 07/12/18 09:24 Dose:  1,000 mg


Pantoprazole Sodium (Protonix Ec Tab)  40 mg PO DAILY [facility]


   Last Admin: 07/12/18 09:26 Dose:  40 mg











- Labs


Labs: 


 





 07/12/18 05:50 





 07/12/18 05:50 





 











PT  11.5 Seconds (9.8-13.1)   07/10/18  11:05    


 


INR  1.0  (0.9-1.2)   07/10/18  11:05    


 


APTT  32.1 Seconds (25.6-37.1)   07/10/18  11:05    








- Constitutional


Appears: Non-toxic, No Acute Distress





- Head Exam


Head Exam: ATRAUMATIC, NORMAL INSPECTION, NORMOCEPHALIC





- Eye Exam


Eye Exam: EOMI





- ENT Exam


ENT Exam: Mucous Membranes Moist, Normal External Ear Exam





- Neck Exam


Neck Exam: Full ROM.  absent: Meningismus





- Respiratory Exam


Respiratory Exam: NORMAL BREATHING PATTERN.  absent: Respiratory Distress





- Cardiovascular Exam


Cardiovascular Exam: REGULAR RHYTHM, +S1, +S2





- GI/Abdominal Exam


GI & Abdominal Exam: Soft, Normal Bowel Sounds.  absent: Tenderness





- Extremities Exam


Extremities Exam: Full ROM, Normal Capillary Refill.  absent: Calf Tenderness





- Back Exam


Back Exam: Full ROM.  absent: CVA tenderness (L), CVA tenderness (R)





- Neurological Exam


Neurological Exam: Alert, Awake, CN II-XII Intact, Oriented x3


Neuro motor strength exam: Left Upper Extremity: 5, Right Upper Extremity: 5, 

Left Lower Extremity: 5, Right Lower Extremity: 5





- Psychiatric Exam


Psychiatric exam: Normal Affect, Normal Mood





- Skin


Skin Exam: Dry, Normal Color, Warm





Assessment and Plan


(1) Fever with chills


Status: Acute   





(2) Leukocytosis


Status: Acute   





(3) Headache


Status: Acute   





(4) Pancreatic mass


Status: Chronic   





(5) DMII (diabetes mellitus, type 2)


Status: Chronic   





- Assessment and Plan (Free Text)


Assessment: 





72 y/o female with hx of DM type II, Pancreatic cyst , was brought in bec of 

fever , chills and headache.  In the ED found to have fever and leukocytosis.  

Lumbar Puncture was done  and CSF Cell Count showed WBC=22 with lymphocyte 

predominace, CSF Glucose and protein normal.








(1) Fever with chills, Leukocytosis and Headache


Status: Acute   


unclear etiology


question of Aseptic Meningitis


- check CSF   HSV, Enterovirus


- empirically started on IV Ceftriaxone, Vanco and Acyclovir 


- ID consulted - Dr Edgar rec to d/c IV Vanco   and Ceftriaxone since CSF 

analysis was not compatible with Bacterial Meningitis


- rec to start IV Zosyn and to cont IV Acyclovir


- Pancultured - Blood c/s  negative so far,   CSF : neg Gram stain,   Urine c/s 

: multiple species


- CXR showed Atelectasis , scarring- however pt has no resp sxs.- no cough - pt 

started on IV Zosyn


- at present pt is afebrile, denies HA, no CNS sxs, no cough, no dysuria, no 

abd pain





(2) Pancreatic Cyst


Status: Chronic   


CT scan showed Pancreatic cyst - this has been present since 2014 , pt has had 

CT scan to ff up this cyst and was eval by GI, pt denies abd pain


ff up with Primary care physician





(3) DMII (diabetes mellitus, type 2)


Status: Chronic


accucheck with coverage


cont Metformin





4. HTN


cont Lisinopril





5.  Right thigh pain, resolved 


Doppler Us  ruled out DVT


Femur xray : no fracture





DVT proph


-Lovenox

## 2018-07-12 NOTE — CP.PCM.PN
Subjective





- Date & Time of Evaluation


Date of Evaluation: 07/12/18


Time of Evaluation: 11:17





- Subjective


Subjective: 





ID Note-





pt. seen and examined today.


states feels better.


denies any fever or chills.


denies any sob.


states had mild abd discomfort but has resolved.


denies any diarrhea.





Objective





- Vital Signs/Intake and Output


Vital Signs (last 24 hours): 


 











Temp Pulse Resp BP Pulse Ox


 


 98.3 F   86   18   110/60   95 


 


 07/12/18 08:53  07/12/18 09:32  07/12/18 08:53  07/12/18 09:32  07/12/18 08:53











- Medications


Medications: 


 Current Medications





Acetaminophen (Tylenol 325mg Tab)  650 mg PO Q6 PRN


   PRN Reason: Pain, Mild (1-3)


Acetaminophen (Tylenol 325mg Tab)  650 mg PO Q6 PRN


   PRN Reason: Fever >100.4 F


Aspirin (Ecotrin)  81 mg PO DAILY [facility]


   Last Admin: 07/12/18 09:24 Dose:  81 mg


Atorvastatin Calcium (Lipitor)  20 mg PO DAILY [facility]


   Last Admin: 07/12/18 09:24 Dose:  20 mg


Docusate Sodium (Colace)  100 mg PO BID [facility]


   Last Admin: 07/12/18 09:23 Dose:  Not Given


Enoxaparin Sodium (Lovenox)  40 mg SC DAILY [facility]


   PRN Reason: Protocol


   Last Admin: 07/12/18 09:25 Dose:  40 mg


Sodium Chloride (Sodium Chloride 0.9%)  1,000 mls @ 100 mls/hr IV .Q10H [facility]


   Last Admin: 07/10/18 18:54 Dose:  100 mls/hr


Acyclovir 500 mg/ Sodium (Chloride)  100 mls @ 100 mls/hr IVPB Q8 [facility]


   PRN Reason: Protocol


   Last Admin: 07/12/18 09:30 Dose:  100 mls/hr


Piperacillin Sod/Tazobactam (Sod 3.375 gm/ Sodium Chloride)  100 mls @ 100 mls/

hr IVPB Q6 [facility]


   PRN Reason: Protocol


   Last Admin: 07/12/18 09:28 Dose:  100 mls/hr


Lisinopril (Zestril)  5 mg PO DAILY [facility]


   Last Admin: 07/12/18 09:32 Dose:  5 mg


Metformin HCl (Glucophage)  1,000 mg PO BID [facility]


   Last Admin: 07/12/18 09:24 Dose:  1,000 mg


Pantoprazole Sodium (Protonix Ec Tab)  40 mg PO DAILY [facility]


   Last Admin: 07/12/18 09:26 Dose:  40 mg











- Labs


Labs: 


 





 





- Additional Findings


Additional findings: 





- Constitutional


Appears: No Acute Distress





- Head Exam


Head Exam: ATRAUMATIC





- Eye Exam


Eye Exam: EOMI





- ENT Exam


ENT Exam: Normal Oropharynx





- Neck Exam


Neck exam: Positive for: Full Rom


Additional comments: 





supple





- Respiratory Exam


Respiratory Exam: NORMAL BREATHING PATTERN


Additional comments: 





no wheezing


slightly decreased breath sounds at right base





- Cardiovascular Exam


Cardiovascular Exam: RRR, +S1, +S2





- Extremities Exam


Extremities exam: Positive for: normal inspection





- Neurological Exam


Neurological exam: Alert, Oriented x 3





 Laboratory Results - last 72 hr











  07/10/18 07/10/18 07/10/18





  11:05 11:05 11:05


 


WBC  19.4 H D  


 


RBC  3.93  


 


Hgb  12.4  


 


Hct  37.3  


 


MCV  94.9  


 


MCH  31.6 H  


 


MCHC  33.3  


 


RDW  13.3  


 


Plt Count  222  


 


MPV  8.6  


 


Neut % (Auto)  91.3 H  


 


Lymph % (Auto)  3.3 L  


 


Mono % (Auto)  4.5  


 


Eos % (Auto)  0.5  


 


Baso % (Auto)  0.4  


 


Neut # (Auto)  17.7 H  


 


Lymph # (Auto)  0.6 L  


 


Mono # (Auto)  0.9 H  


 


Eos # (Auto)  0.1  


 


Baso # (Auto)  0.1  


 


Neutrophils % (Manual)  90 H  


 


Band Neutrophils %  3 H  


 


Lymphocytes % (Manual)  2 L  


 


Monocytes % (Manual)  4  


 


Eosinophils % (Manual)  1  


 


Platelet Estimate  Normal  


 


RBC Morphology  Normal  


 


PT    11.5


 


INR    1.0


 


APTT    32.1


 


D-Dimer, Quantitative   


 


pO2   


 


VBG pH   


 


VBG pCO2   


 


VBG HCO3   


 


VBG Total CO2   


 


VBG O2 Sat (Calc)   


 


VBG Base Excess   


 


VBG Potassium   


 


Glucose   


 


Lactate   


 


FiO2   


 


Sodium   139 


 


Potassium   3.7 


 


Chloride   102 


 


Carbon Dioxide   26 


 


Anion Gap   15 


 


BUN   17 


 


Creatinine   0.6 L 


 


Est GFR ( Amer)   > 60 


 


Est GFR (Non-Af Amer)   > 60 


 


POC Glucose (mg/dL)   


 


Random Glucose   144 H 


 


Hemoglobin A1c   


 


Calcium   9.3 


 


Phosphorus   2.4 L 


 


Magnesium   1.6 


 


Total Bilirubin   0.6 


 


AST   45 H D 


 


ALT   30 


 


Alkaline Phosphatase   82 


 


Total Protein   7.5 


 


Albumin   4.0 


 


Globulin   3.5 


 


Albumin/Globulin Ratio   1.1 


 


Venous Blood Potassium   


 


Urine Color   


 


Urine Clarity   


 


Urine pH   


 


Ur Specific Gravity   


 


Urine Protein   


 


Urine Glucose (UA)   


 


Urine Ketones   


 


Urine Blood   


 


Urine Nitrate   


 


Urine Bilirubin   


 


Urine Urobilinogen   


 


Ur Leukocyte Esterase   


 


Urine RBC (Auto)   


 


Urine Microscopic WBC   


 


Urine Bacteria   


 


Fluid Type   


 


CSF Volume   


 


CSF Appearance   


 


CSF WBC   


 


CSF RBC   


 


CSF Total Cell Counted   


 


CSF Neutrophils   


 


CSF Lymphocytes   


 


CSF Monos/Macrophages   


 


CSF Comment   


 


CSF Glucose   


 


CSF Total Protein   


 


CSF VDRL   


 


HSV Source Description   


 


HSV I DNA PCR   


 


HSV II DNA PCR   














  07/10/18 07/10/18 07/10/18





  11:15 11:22 11:35


 


WBC   


 


RBC   


 


Hgb   


 


Hct   


 


MCV   


 


MCH   


 


MCHC   


 


RDW   


 


Plt Count   


 


MPV   


 


Neut % (Auto)   


 


Lymph % (Auto)   


 


Mono % (Auto)   


 


Eos % (Auto)   


 


Baso % (Auto)   


 


Neut # (Auto)   


 


Lymph # (Auto)   


 


Mono # (Auto)   


 


Eos # (Auto)   


 


Baso # (Auto)   


 


Neutrophils % (Manual)   


 


Band Neutrophils %   


 


Lymphocytes % (Manual)   


 


Monocytes % (Manual)   


 


Eosinophils % (Manual)   


 


Platelet Estimate   


 


RBC Morphology   


 


PT   


 


INR   


 


APTT   


 


D-Dimer, Quantitative   


 


pO2  46  


 


VBG pH  7.44 H  


 


VBG pCO2  43  


 


VBG HCO3  28.0  


 


VBG Total CO2  30.5 H  


 


VBG O2 Sat (Calc)  89.0 H  


 


VBG Base Excess  4.4 H  


 


VBG Potassium  3.4 L  


 


Glucose  151 H  


 


Lactate  1.9  


 


FiO2  21.0  


 


Sodium  135.0  


 


Potassium   


 


Chloride  102.0  


 


Carbon Dioxide   


 


Anion Gap   


 


BUN   


 


Creatinine   


 


Est GFR ( Amer)   


 


Est GFR (Non-Af Amer)   


 


POC Glucose (mg/dL)   139 H 


 


Random Glucose   


 


Hemoglobin A1c   


 


Calcium   


 


Phosphorus   


 


Magnesium   


 


Total Bilirubin   


 


AST   


 


ALT   


 


Alkaline Phosphatase   


 


Total Protein   


 


Albumin   


 


Globulin   


 


Albumin/Globulin Ratio   


 


Venous Blood Potassium  3.4 L  


 


Urine Color    Yellow


 


Urine Clarity    Slighty-cloudy


 


Urine pH    7.0


 


Ur Specific Gravity    1.016


 


Urine Protein    Negative


 


Urine Glucose (UA)    50


 


Urine Ketones    Negative


 


Urine Blood    Negative


 


Urine Nitrate    Negative


 


Urine Bilirubin    Negative


 


Urine Urobilinogen    0.2-1.0


 


Ur Leukocyte Esterase    Neg


 


Urine RBC (Auto)    3


 


Urine Microscopic WBC    < 1


 


Urine Bacteria    Rare


 


Fluid Type   


 


CSF Volume   


 


CSF Appearance   


 


CSF WBC   


 


CSF RBC   


 


CSF Total Cell Counted   


 


CSF Neutrophils   


 


CSF Lymphocytes   


 


CSF Monos/Macrophages   


 


CSF Comment   


 


CSF Glucose   


 


CSF Total Protein   


 


CSF VDRL   


 


HSV Source Description   


 


HSV I DNA PCR   


 


HSV II DNA PCR   














  07/10/18 07/10/18 07/10/18





  16:00 16:00 16:00


 


WBC   


 


RBC   


 


Hgb   


 


Hct   


 


MCV   


 


MCH   


 


MCHC   


 


RDW   


 


Plt Count   


 


MPV   


 


Neut % (Auto)   


 


Lymph % (Auto)   


 


Mono % (Auto)   


 


Eos % (Auto)   


 


Baso % (Auto)   


 


Neut # (Auto)   


 


Lymph # (Auto)   


 


Mono # (Auto)   


 


Eos # (Auto)   


 


Baso # (Auto)   


 


Neutrophils % (Manual)   


 


Band Neutrophils %   


 


Lymphocytes % (Manual)   


 


Monocytes % (Manual)   


 


Eosinophils % (Manual)   


 


Platelet Estimate   


 


RBC Morphology   


 


PT   


 


INR   


 


APTT   


 


D-Dimer, Quantitative   


 


pO2   


 


VBG pH   


 


VBG pCO2   


 


VBG HCO3   


 


VBG Total CO2   


 


VBG O2 Sat (Calc)   


 


VBG Base Excess   


 


VBG Potassium   


 


Glucose   


 


Lactate   


 


FiO2   


 


Sodium   


 


Potassium   


 


Chloride   


 


Carbon Dioxide   


 


Anion Gap   


 


BUN   


 


Creatinine   


 


Est GFR ( Amer)   


 


Est GFR (Non-Af Amer)   


 


POC Glucose (mg/dL)   


 


Random Glucose   


 


Hemoglobin A1c   


 


Calcium   


 


Phosphorus   


 


Magnesium   


 


Total Bilirubin   


 


AST   


 


ALT   


 


Alkaline Phosphatase   


 


Total Protein   


 


Albumin   


 


Globulin   


 


Albumin/Globulin Ratio   


 


Venous Blood Potassium   


 


Urine Color   


 


Urine Clarity   


 


Urine pH   


 


Ur Specific Gravity   


 


Urine Protein   


 


Urine Glucose (UA)   


 


Urine Ketones   


 


Urine Blood   


 


Urine Nitrate   


 


Urine Bilirubin   


 


Urine Urobilinogen   


 


Ur Leukocyte Esterase   


 


Urine RBC (Auto)   


 


Urine Microscopic WBC   


 


Urine Bacteria   


 


Fluid Type  Spinal fluid  


 


CSF Volume  1  


 


CSF Appearance  Clear/colorless  


 


CSF WBC  1.0  


 


CSF RBC  0.0  


 


CSF Total Cell Counted  22 H  


 


CSF Neutrophils  1 H  


 


CSF Lymphocytes  16.0 H  


 


CSF Monos/Macrophages  1 H  


 


CSF Comment  Clear  


 


CSF Glucose   74 H 


 


CSF Total Protein   


 


CSF VDRL   


 


HSV Source Description    Csf


 


HSV I DNA PCR    Not detected


 


HSV II DNA PCR    Not detected














  07/10/18 07/10/18 07/10/18





  16:00 16:00 17:34


 


WBC   


 


RBC   


 


Hgb   


 


Hct   


 


MCV   


 


MCH   


 


MCHC   


 


RDW   


 


Plt Count   


 


MPV   


 


Neut % (Auto)   


 


Lymph % (Auto)   


 


Mono % (Auto)   


 


Eos % (Auto)   


 


Baso % (Auto)   


 


Neut # (Auto)   


 


Lymph # (Auto)   


 


Mono # (Auto)   


 


Eos # (Auto)   


 


Baso # (Auto)   


 


Neutrophils % (Manual)   


 


Band Neutrophils %   


 


Lymphocytes % (Manual)   


 


Monocytes % (Manual)   


 


Eosinophils % (Manual)   


 


Platelet Estimate   


 


RBC Morphology   


 


PT   


 


INR   


 


APTT   


 


D-Dimer, Quantitative   


 


pO2   


 


VBG pH   


 


VBG pCO2   


 


VBG HCO3   


 


VBG Total CO2   


 


VBG O2 Sat (Calc)   


 


VBG Base Excess   


 


VBG Potassium   


 


Glucose   


 


Lactate   


 


FiO2   


 


Sodium   


 


Potassium   


 


Chloride   


 


Carbon Dioxide   


 


Anion Gap   


 


BUN   


 


Creatinine   


 


Est GFR ( Amer)   


 


Est GFR (Non-Af Amer)   


 


POC Glucose (mg/dL)    149 H


 


Random Glucose   


 


Hemoglobin A1c   


 


Calcium   


 


Phosphorus   


 


Magnesium   


 


Total Bilirubin   


 


AST   


 


ALT   


 


Alkaline Phosphatase   


 


Total Protein   


 


Albumin   


 


Globulin   


 


Albumin/Globulin Ratio   


 


Venous Blood Potassium   


 


Urine Color   


 


Urine Clarity   


 


Urine pH   


 


Ur Specific Gravity   


 


Urine Protein   


 


Urine Glucose (UA)   


 


Urine Ketones   


 


Urine Blood   


 


Urine Nitrate   


 


Urine Bilirubin   


 


Urine Urobilinogen   


 


Ur Leukocyte Esterase   


 


Urine RBC (Auto)   


 


Urine Microscopic WBC   


 


Urine Bacteria   


 


Fluid Type   


 


CSF Volume   


 


CSF Appearance   


 


CSF WBC   


 


CSF RBC   


 


CSF Total Cell Counted   


 


CSF Neutrophils   


 


CSF Lymphocytes   


 


CSF Monos/Macrophages   


 


CSF Comment   


 


CSF Glucose   


 


CSF Total Protein  55.0  


 


CSF VDRL   Nonreactive 


 


HSV Source Description   


 


HSV I DNA PCR   


 


HSV II DNA PCR   














  07/10/18 07/10/18 07/11/18





  21:20 21:39 05:31


 


WBC   


 


RBC   


 


Hgb   


 


Hct   


 


MCV   


 


MCH   


 


MCHC   


 


RDW   


 


Plt Count   


 


MPV   


 


Neut % (Auto)   


 


Lymph % (Auto)   


 


Mono % (Auto)   


 


Eos % (Auto)   


 


Baso % (Auto)   


 


Neut # (Auto)   


 


Lymph # (Auto)   


 


Mono # (Auto)   


 


Eos # (Auto)   


 


Baso # (Auto)   


 


Neutrophils % (Manual)   


 


Band Neutrophils %   


 


Lymphocytes % (Manual)   


 


Monocytes % (Manual)   


 


Eosinophils % (Manual)   


 


Platelet Estimate   


 


RBC Morphology   


 


PT   


 


INR   


 


APTT   


 


D-Dimer, Quantitative   172 


 


pO2   


 


VBG pH   


 


VBG pCO2   


 


VBG HCO3   


 


VBG Total CO2   


 


VBG O2 Sat (Calc)   


 


VBG Base Excess   


 


VBG Potassium   


 


Glucose   


 


Lactate   


 


FiO2   


 


Sodium   


 


Potassium   


 


Chloride   


 


Carbon Dioxide   


 


Anion Gap   


 


BUN   


 


Creatinine   


 


Est GFR ( Amer)   


 


Est GFR (Non-Af Amer)   


 


POC Glucose (mg/dL)  256 H   200 H


 


Random Glucose   


 


Hemoglobin A1c   


 


Calcium   


 


Phosphorus   


 


Magnesium   


 


Total Bilirubin   


 


AST   


 


ALT   


 


Alkaline Phosphatase   


 


Total Protein   


 


Albumin   


 


Globulin   


 


Albumin/Globulin Ratio   


 


Venous Blood Potassium   


 


Urine Color   


 


Urine Clarity   


 


Urine pH   


 


Ur Specific Gravity   


 


Urine Protein   


 


Urine Glucose (UA)   


 


Urine Ketones   


 


Urine Blood   


 


Urine Nitrate   


 


Urine Bilirubin   


 


Urine Urobilinogen   


 


Ur Leukocyte Esterase   


 


Urine RBC (Auto)   


 


Urine Microscopic WBC   


 


Urine Bacteria   


 


Fluid Type   


 


CSF Volume   


 


CSF Appearance   


 


CSF WBC   


 


CSF RBC   


 


CSF Total Cell Counted   


 


CSF Neutrophils   


 


CSF Lymphocytes   


 


CSF Monos/Macrophages   


 


CSF Comment   


 


CSF Glucose   


 


CSF Total Protein   


 


CSF VDRL   


 


HSV Source Description   


 


HSV I DNA PCR   


 


HSV II DNA PCR   














  07/11/18 07/11/18 07/11/18





  06:40 06:40 06:40


 


WBC  18.2 H  


 


RBC  3.79 L  


 


Hgb  12.0  


 


Hct  36.1  


 


MCV  95.1  


 


MCH  31.8 H  


 


MCHC  33.4  


 


RDW  13.4  


 


Plt Count  234  


 


MPV  8.9  


 


Neut % (Auto)  96.0 H  


 


Lymph % (Auto)  2.6 L  


 


Mono % (Auto)  1.3  


 


Eos % (Auto)  0.0  


 


Baso % (Auto)  0.1  


 


Neut # (Auto)  17.5 H  


 


Lymph # (Auto)  0.5 L  


 


Mono # (Auto)  0.2  


 


Eos # (Auto)  0.0  


 


Baso # (Auto)  0.0  


 


Neutrophils % (Manual)  94 H  


 


Band Neutrophils %  2  


 


Lymphocytes % (Manual)  3 L  


 


Monocytes % (Manual)  1  


 


Eosinophils % (Manual)   


 


Platelet Estimate  Normal  


 


RBC Morphology  Normal  


 


PT   


 


INR   


 


APTT   


 


D-Dimer, Quantitative   


 


pO2   


 


VBG pH   


 


VBG pCO2   


 


VBG HCO3   


 


VBG Total CO2   


 


VBG O2 Sat (Calc)   


 


VBG Base Excess   


 


VBG Potassium   


 


Glucose   


 


Lactate   


 


FiO2   


 


Sodium   140 


 


Potassium   4.1 


 


Chloride   106 


 


Carbon Dioxide   24 


 


Anion Gap   14 


 


BUN   18 H 


 


Creatinine   0.6 L 


 


Est GFR ( Amer)   > 60 


 


Est GFR (Non-Af Amer)   > 60 


 


POC Glucose (mg/dL)   


 


Random Glucose   211 H 


 


Hemoglobin A1c    7.3 H D


 


Calcium   9.0 


 


Phosphorus   


 


Magnesium   


 


Total Bilirubin   


 


AST   


 


ALT   


 


Alkaline Phosphatase   


 


Total Protein   


 


Albumin   


 


Globulin   


 


Albumin/Globulin Ratio   


 


Venous Blood Potassium   


 


Urine Color   


 


Urine Clarity   


 


Urine pH   


 


Ur Specific Gravity   


 


Urine Protein   


 


Urine Glucose (UA)   


 


Urine Ketones   


 


Urine Blood   


 


Urine Nitrate   


 


Urine Bilirubin   


 


Urine Urobilinogen   


 


Ur Leukocyte Esterase   


 


Urine RBC (Auto)   


 


Urine Microscopic WBC   


 


Urine Bacteria   


 


Fluid Type   


 


CSF Volume   


 


CSF Appearance   


 


CSF WBC   


 


CSF RBC   


 


CSF Total Cell Counted   


 


CSF Neutrophils   


 


CSF Lymphocytes   


 


CSF Monos/Macrophages   


 


CSF Comment   


 


CSF Glucose   


 


CSF Total Protein   


 


CSF VDRL   


 


HSV Source Description   


 


HSV I DNA PCR   


 


HSV II DNA PCR   














  07/11/18 07/11/18 07/11/18





  10:53 15:54 21:51


 


WBC   


 


RBC   


 


Hgb   


 


Hct   


 


MCV   


 


MCH   


 


MCHC   


 


RDW   


 


Plt Count   


 


MPV   


 


Neut % (Auto)   


 


Lymph % (Auto)   


 


Mono % (Auto)   


 


Eos % (Auto)   


 


Baso % (Auto)   


 


Neut # (Auto)   


 


Lymph # (Auto)   


 


Mono # (Auto)   


 


Eos # (Auto)   


 


Baso # (Auto)   


 


Neutrophils % (Manual)   


 


Band Neutrophils %   


 


Lymphocytes % (Manual)   


 


Monocytes % (Manual)   


 


Eosinophils % (Manual)   


 


Platelet Estimate   


 


RBC Morphology   


 


PT   


 


INR   


 


APTT   


 


D-Dimer, Quantitative   


 


pO2   


 


VBG pH   


 


VBG pCO2   


 


VBG HCO3   


 


VBG Total CO2   


 


VBG O2 Sat (Calc)   


 


VBG Base Excess   


 


VBG Potassium   


 


Glucose   


 


Lactate   


 


FiO2   


 


Sodium   


 


Potassium   


 


Chloride   


 


Carbon Dioxide   


 


Anion Gap   


 


BUN   


 


Creatinine   


 


Est GFR ( Amer)   


 


Est GFR (Non-Af Amer)   


 


POC Glucose (mg/dL)  270 H  214 H  176 H


 


Random Glucose   


 


Hemoglobin A1c   


 


Calcium   


 


Phosphorus   


 


Magnesium   


 


Total Bilirubin   


 


AST   


 


ALT   


 


Alkaline Phosphatase   


 


Total Protein   


 


Albumin   


 


Globulin   


 


Albumin/Globulin Ratio   


 


Venous Blood Potassium   


 


Urine Color   


 


Urine Clarity   


 


Urine pH   


 


Ur Specific Gravity   


 


Urine Protein   


 


Urine Glucose (UA)   


 


Urine Ketones   


 


Urine Blood   


 


Urine Nitrate   


 


Urine Bilirubin   


 


Urine Urobilinogen   


 


Ur Leukocyte Esterase   


 


Urine RBC (Auto)   


 


Urine Microscopic WBC   


 


Urine Bacteria   


 


Fluid Type   


 


CSF Volume   


 


CSF Appearance   


 


CSF WBC   


 


CSF RBC   


 


CSF Total Cell Counted   


 


CSF Neutrophils   


 


CSF Lymphocytes   


 


CSF Monos/Macrophages   


 


CSF Comment   


 


CSF Glucose   


 


CSF Total Protein   


 


CSF VDRL   


 


HSV Source Description   


 


HSV I DNA PCR   


 


HSV II DNA PCR   














  07/12/18 07/12/18 07/12/18





  03:57 05:50 05:50


 


WBC   11.3 H 


 


RBC   3.34 L 


 


Hgb   10.8 L 


 


Hct   31.9 L 


 


MCV   95.7 


 


MCH   32.2 H 


 


MCHC   33.7 


 


RDW   13.7 


 


Plt Count   205 


 


MPV   8.9 


 


Neut % (Auto)   78.6 H 


 


Lymph % (Auto)   13.7 L 


 


Mono % (Auto)   7.1 


 


Eos % (Auto)   0.5 


 


Baso % (Auto)   0.1 


 


Neut # (Auto)   8.9 H 


 


Lymph # (Auto)   1.6 


 


Mono # (Auto)   0.8 


 


Eos # (Auto)   0.1 


 


Baso # (Auto)   0.0 


 


Neutrophils % (Manual)   


 


Band Neutrophils %   


 


Lymphocytes % (Manual)   


 


Monocytes % (Manual)   


 


Eosinophils % (Manual)   


 


Platelet Estimate   


 


RBC Morphology   


 


PT   


 


INR   


 


APTT   


 


D-Dimer, Quantitative   


 


pO2   


 


VBG pH   


 


VBG pCO2   


 


VBG HCO3   


 


VBG Total CO2   


 


VBG O2 Sat (Calc)   


 


VBG Base Excess   


 


VBG Potassium   


 


Glucose   


 


Lactate   


 


FiO2   


 


Sodium    141


 


Potassium    4.3


 


Chloride    108 H


 


Carbon Dioxide    23


 


Anion Gap    14


 


BUN    18 H


 


Creatinine    0.6 L


 


Est GFR ( Amer)    > 60


 


Est GFR (Non-Af Amer)    > 60


 


POC Glucose (mg/dL)  161 H  


 


Random Glucose    145 H


 


Hemoglobin A1c   


 


Calcium    9.0


 


Phosphorus   


 


Magnesium   


 


Total Bilirubin    0.2


 


AST    17


 


ALT    25


 


Alkaline Phosphatase    55


 


Total Protein    6.1 L


 


Albumin    3.0 L D


 


Globulin    3.1


 


Albumin/Globulin Ratio    1.0


 


Venous Blood Potassium   


 


Urine Color   


 


Urine Clarity   


 


Urine pH   


 


Ur Specific Gravity   


 


Urine Protein   


 


Urine Glucose (UA)   


 


Urine Ketones   


 


Urine Blood   


 


Urine Nitrate   


 


Urine Bilirubin   


 


Urine Urobilinogen   


 


Ur Leukocyte Esterase   


 


Urine RBC (Auto)   


 


Urine Microscopic WBC   


 


Urine Bacteria   


 


Fluid Type   


 


CSF Volume   


 


CSF Appearance   


 


CSF WBC   


 


CSF RBC   


 


CSF Total Cell Counted   


 


CSF Neutrophils   


 


CSF Lymphocytes   


 


CSF Monos/Macrophages   


 


CSF Comment   


 


CSF Glucose   


 


CSF Total Protein   


 


CSF VDRL   


 


HSV Source Description   


 


HSV I DNA PCR   


 


HSV II DNA PCR   














  07/12/18 07/12/18 07/12/18





  06:01 11:54 15:30


 


WBC   


 


RBC   


 


Hgb   


 


Hct   


 


MCV   


 


MCH   


 


MCHC   


 


RDW   


 


Plt Count   


 


MPV   


 


Neut % (Auto)   


 


Lymph % (Auto)   


 


Mono % (Auto)   


 


Eos % (Auto)   


 


Baso % (Auto)   


 


Neut # (Auto)   


 


Lymph # (Auto)   


 


Mono # (Auto)   


 


Eos # (Auto)   


 


Baso # (Auto)   


 


Neutrophils % (Manual)   


 


Band Neutrophils %   


 


Lymphocytes % (Manual)   


 


Monocytes % (Manual)   


 


Eosinophils % (Manual)   


 


Platelet Estimate   


 


RBC Morphology   


 


PT   


 


INR   


 


APTT   


 


D-Dimer, Quantitative   


 


pO2   


 


VBG pH   


 


VBG pCO2   


 


VBG HCO3   


 


VBG Total CO2   


 


VBG O2 Sat (Calc)   


 


VBG Base Excess   


 


VBG Potassium   


 


Glucose   


 


Lactate   


 


FiO2   


 


Sodium   


 


Potassium   


 


Chloride   


 


Carbon Dioxide   


 


Anion Gap   


 


BUN   


 


Creatinine   


 


Est GFR ( Amer)   


 


Est GFR (Non-Af Amer)   


 


POC Glucose (mg/dL)  138 H  137 H  108


 


Random Glucose   


 


Hemoglobin A1c   


 


Calcium   


 


Phosphorus   


 


Magnesium   


 


Total Bilirubin   


 


AST   


 


ALT   


 


Alkaline Phosphatase   


 


Total Protein   


 


Albumin   


 


Globulin   


 


Albumin/Globulin Ratio   


 


Venous Blood Potassium   


 


Urine Color   


 


Urine Clarity   


 


Urine pH   


 


Ur Specific Gravity   


 


Urine Protein   


 


Urine Glucose (UA)   


 


Urine Ketones   


 


Urine Blood   


 


Urine Nitrate   


 


Urine Bilirubin   


 


Urine Urobilinogen   


 


Ur Leukocyte Esterase   


 


Urine RBC (Auto)   


 


Urine Microscopic WBC   


 


Urine Bacteria   


 


Fluid Type   


 


CSF Volume   


 


CSF Appearance   


 


CSF WBC   


 


CSF RBC   


 


CSF Total Cell Counted   


 


CSF Neutrophils   


 


CSF Lymphocytes   


 


CSF Monos/Macrophages   


 


CSF Comment   


 


CSF Glucose   


 


CSF Total Protein   


 


CSF VDRL   


 


HSV Source Description   


 


HSV I DNA PCR   


 


HSV II DNA PCR   








 Microbiology





07/10/18 16:00   Cerebral Spinal Fluid   Gram Stain - Final


07/10/18 16:00   Cerebral Spinal Fluid   CSF Culture - Preliminary


                            NO GROWTH AFTER 2 DAYS


07/10/18 11:05   Blood   Blood Culture - Preliminary


                            NO GROWTH AFTER 48 HOURS


07/10/18 11:35   Urine,Clean Catch   Urine Culture - Final


                            10-50,000 CFU/ML.


                            MULTIPLE SPECIES. PROBABLE CONTAMINATION.





 





 











Assessment and Plan


(1) Fever with chills


Status: Acute   





(2) Leukocytosis


Status: Acute   





- Assessment and Plan (Free Text)


Assessment: 





A/P-


73 year old female with PMH of DM II, HTN was admitted with fever/chills and HA 

..





remains afebrile


leukocytosis much improved today


abd /pelvic ct reported as ? enteritis and ? pancreatic psudocyst or mass


cxr reported as b/l ower lung filed atelactasis.


UA- negative


urine cx- contaminant


blood cx- neg


csf gram stain and cx- negative 48 hours


CSF HSV PCR 1/2- negative





Plan-


d/c Iv acyclovir.


advise to continue with IV zosyn today day #2.


pt. can be d/c home tomm if remains well.


can be d/c on oral levaquin 500 mg qd for 3 days as outpatient.


Also advise GI evaluation for the pancreatic findings.





all above d/w patient and  she verbalizes full understanding of all above and 

agrees with above plan of care.

## 2018-07-13 VITALS
DIASTOLIC BLOOD PRESSURE: 65 MMHG | RESPIRATION RATE: 20 BRPM | HEART RATE: 66 BPM | OXYGEN SATURATION: 97 % | TEMPERATURE: 98.1 F | SYSTOLIC BLOOD PRESSURE: 121 MMHG

## 2018-07-13 LAB
BASOPHILS # BLD AUTO: 0 K/UL (ref 0–0.2)
BASOPHILS NFR BLD: 0.4 % (ref 0–2)
BUN SERPL-MCNC: 14 MG/DL (ref 7–17)
CALCIUM SERPL-MCNC: 8.9 MG/DL (ref 8.4–10.2)
EOSINOPHIL # BLD AUTO: 0.3 K/UL (ref 0–0.7)
EOSINOPHIL NFR BLD: 3.8 % (ref 0–4)
ERYTHROCYTE [DISTWIDTH] IN BLOOD BY AUTOMATED COUNT: 13.5 % (ref 11.5–14.5)
GFR NON-AFRICAN AMERICAN: > 60
HGB BLD-MCNC: 11.3 G/DL (ref 12–16)
LYMPHOCYTES # BLD AUTO: 2.2 K/UL (ref 1–4.3)
LYMPHOCYTES NFR BLD AUTO: 31 % (ref 20–40)
MCH RBC QN AUTO: 31.5 PG (ref 27–31)
MCHC RBC AUTO-ENTMCNC: 33 G/DL (ref 33–37)
MCV RBC AUTO: 95.5 FL (ref 81–99)
MONOCYTES # BLD: 0.6 K/UL (ref 0–0.8)
MONOCYTES NFR BLD: 7.7 % (ref 0–10)
N MEN SG B+E COLI K1 AG SPEC QL: NEGATIVE
N MEN SG W135 AG SPEC QL: NEGATIVE
NEUTROPHILS # BLD: 4.1 K/UL (ref 1.8–7)
NEUTROPHILS NFR BLD AUTO: 57.1 % (ref 50–75)
NRBC BLD AUTO-RTO: 0 % (ref 0–0)
PLATELET # BLD: 235 K/UL (ref 130–400)
PMV BLD AUTO: 8.9 FL (ref 7.2–11.7)
RBC # BLD AUTO: 3.59 MIL/UL (ref 3.8–5.2)
STREP PNEUMONIAE: NEGATIVE
STREPTOCOCCUS B: NEGATIVE
WBC # BLD AUTO: 7.2 K/UL (ref 4.8–10.8)

## 2018-07-13 RX ADMIN — ENOXAPARIN SODIUM SCH MG: 40 INJECTION SUBCUTANEOUS at 09:15

## 2018-07-13 RX ADMIN — WATER SCH MLS/HR: 1 INJECTION INTRAMUSCULAR; INTRAVENOUS; SUBCUTANEOUS at 10:38

## 2018-07-13 RX ADMIN — PANTOPRAZOLE SODIUM SCH MG: 40 TABLET, DELAYED RELEASE ORAL at 09:16

## 2018-07-13 RX ADMIN — WATER SCH MLS/HR: 1 INJECTION INTRAMUSCULAR; INTRAVENOUS; SUBCUTANEOUS at 03:34

## 2018-07-13 NOTE — CP.PCM.DIS
Provider





- Provider


Date of Admission: 


07/10/18 13:51





Attending physician: 


Huber Teresa MD





Time Spent in preparation of Discharge (in minutes): 30





Hospital Course





- Lab Results


Lab Results: 


 Micro Results





07/10/18 11:05   Blood   Blood Culture - Preliminary


                            NO GROWTH AFTER 3 DAYS


07/11/18 22:39   Urine   Urine Culture - Final


                            No Growth (<1,000 CFU/ML)


07/10/18 16:00   Cerebral Spinal Fluid   Gram Stain - Final


07/10/18 16:00   Cerebral Spinal Fluid   CSF Culture - Preliminary


                            NO GROWTH AFTER 3 DAYS


07/10/18 11:35   Urine,Clean Catch   Urine Culture - Final


                            10-50,000 CFU/ML.


                            MULTIPLE SPECIES. PROBABLE CONTAMINATION.





 Most Recent Lab Values











WBC  7.2 K/uL (4.8-10.8)   07/13/18  05:35    


 


RBC  3.59 Mil/uL (3.80-5.20)  L  07/13/18  05:35    


 


Hgb  11.3 g/dL (12.0-16.0)  L  07/13/18  05:35    


 


Hct  34.3 % (34.0-47.0)   07/13/18  05:35    


 


MCV  95.5 fl (81.0-99.0)   07/13/18  05:35    


 


MCH  31.5 pg (27.0-31.0)  H  07/13/18  05:35    


 


MCHC  33.0 g/dL (33.0-37.0)   07/13/18  05:35    


 


RDW  13.5 % (11.5-14.5)   07/13/18  05:35    


 


Plt Count  235 K/uL (130-400)   07/13/18  05:35    


 


MPV  8.9 fl (7.2-11.7)   07/13/18  05:35    


 


Neut % (Auto)  57.1 % (50.0-75.0)   07/13/18  05:35    


 


Lymph % (Auto)  31.0 % (20.0-40.0)   07/13/18  05:35    


 


Mono % (Auto)  7.7 % (0.0-10.0)   07/13/18  05:35    


 


Eos % (Auto)  3.8 % (0.0-4.0)   07/13/18  05:35    


 


Baso % (Auto)  0.4 % (0.0-2.0)   07/13/18  05:35    


 


Neut # (Auto)  4.1 K/uL (1.8-7.0)   07/13/18  05:35    


 


Lymph # (Auto)  2.2 K/uL (1.0-4.3)   07/13/18  05:35    


 


Mono # (Auto)  0.6 K/uL (0.0-0.8)   07/13/18  05:35    


 


Eos # (Auto)  0.3 K/uL (0.0-0.7)   07/13/18  05:35    


 


Baso # (Auto)  0.0 K/uL (0.0-0.2)   07/13/18  05:35    


 


Neutrophils % (Manual)  94 % (42-75)  H  07/11/18  06:40    


 


Band Neutrophils %  2 % (0-2)   07/11/18  06:40    


 


Lymphocytes % (Manual)  3 % (20-50)  L  07/11/18  06:40    


 


Monocytes % (Manual)  1 % (0-10)   07/11/18  06:40    


 


Eosinophils % (Manual)  1 % (0-7)   07/10/18  11:05    


 


Platelet Estimate  Normal  (NORMAL)   07/11/18  06:40    


 


RBC Morphology  Normal  (NORMAL)   07/11/18  06:40    


 


PT  11.5 Seconds (9.8-13.1)   07/10/18  11:05    


 


INR  1.0  (0.9-1.2)   07/10/18  11:05    


 


APTT  32.1 Seconds (25.6-37.1)   07/10/18  11:05    


 


D-Dimer, Quantitative  172 ng/mlDDU (0-230)   07/10/18  21:39    


 


pO2  46 mm/Hg (30-55)   07/10/18  11:15    


 


VBG pH  7.44  (7.32-7.43)  H  07/10/18  11:15    


 


VBG pCO2  43 mmHg (40-60)   07/10/18  11:15    


 


VBG HCO3  28.0 mmol/L  07/10/18  11:15    


 


VBG Total CO2  30.5 mmol/L (22-28)  H  07/10/18  11:15    


 


VBG O2 Sat (Calc)  89.0 % (40-65)  H  07/10/18  11:15    


 


VBG Base Excess  4.4 mmol/L (0.0-2.0)  H  07/10/18  11:15    


 


VBG Potassium  3.4 mmol/L (3.6-5.2)  L  07/10/18  11:15    


 


Sodium  135.0 mmol/L (132-148)   07/10/18  11:15    


 


Chloride  102.0 mmol/L ()   07/10/18  11:15    


 


Glucose  151 mg/dL ()  H  07/10/18  11:15    


 


Lactate  1.9 mmol/L (0.7-2.1)   07/10/18  11:15    


 


FiO2  21.0 %  07/10/18  11:15    


 


Sodium  144 mmol/l (132-148)   07/13/18  05:35    


 


Potassium  4.1 MMOL/L (3.6-5.0)   07/13/18  05:35    


 


Chloride  111 mmol/L ()  H  07/13/18  05:35    


 


Carbon Dioxide  27 mmol/L (22-30)   07/13/18  05:35    


 


Anion Gap  10  (10-20)   07/13/18  05:35    


 


BUN  14 mg/dl (7-17)   07/13/18  05:35    


 


Creatinine  0.7 mg/dl (0.7-1.2)   07/13/18  05:35    


 


Est GFR ( Amer)  > 60   07/13/18  05:35    


 


Est GFR (Non-Af Amer)  > 60   07/13/18  05:35    


 


POC Glucose (mg/dL)  119 mg/dL ()  H  07/13/18  06:16    


 


Random Glucose  125 mg/dL ()  H  07/13/18  05:35    


 


Hemoglobin A1c  7.3 % (4.2-6.5)  H D 07/11/18  06:40    


 


Calcium  8.9 mg/dL (8.4-10.2)   07/13/18  05:35    


 


Phosphorus  2.4 mg/dl (2.5-4.5)  L  07/10/18  11:05    


 


Magnesium  1.6 MG/DL (1.6-2.3)   07/10/18  11:05    


 


Total Bilirubin  0.2 mg/dl (0.2-1.3)   07/12/18  05:50    


 


AST  17 U/L (14-36)   07/12/18  05:50    


 


ALT  25 U/L (9-52)   07/12/18  05:50    


 


Alkaline Phosphatase  55 U/L ()   07/12/18  05:50    


 


Total Protein  6.1 G/DL (6.3-8.2)  L  07/12/18  05:50    


 


Albumin  3.0 g/dL (3.5-5.0)  L D 07/12/18  05:50    


 


Globulin  3.1 gm/dL (2.2-3.9)   07/12/18  05:50    


 


Albumin/Globulin Ratio  1.0  (1.0-2.1)   07/12/18  05:50    


 


Venous Blood Potassium  3.4 mmol/L (3.6-5.2)  L  07/10/18  11:15    


 


Urine Color  Yellow  (YELLOW)   07/10/18  11:35    


 


Urine Clarity  Slighty-cloudy  (Clear)   07/10/18  11:35    


 


Urine pH  7.0  (5.0-8.0)   07/10/18  11:35    


 


Ur Specific Gravity  1.016  (1.003-1.030)   07/10/18  11:35    


 


Urine Protein  Negative mg/dL (NEGATIVE)   07/10/18  11:35    


 


Urine Glucose (UA)  50 mg/dL (Normal)   07/10/18  11:35    


 


Urine Ketones  Negative mg/dL (NEGATIVE)   07/10/18  11:35    


 


Urine Blood  Negative  (NEGATIVE)   07/10/18  11:35    


 


Urine Nitrate  Negative  (NEGATIVE)   07/10/18  11:35    


 


Urine Bilirubin  Negative  (NEGATIVE)   07/10/18  11:35    


 


Urine Urobilinogen  0.2-1.0 mg/dL (0.2-1.0)   07/10/18  11:35    


 


Ur Leukocyte Esterase  Neg Shelbi/uL (Negative)   07/10/18  11:35    


 


Urine RBC (Auto)  3 /hpf (0-3)   07/10/18  11:35    


 


Urine Microscopic WBC  < 1 /hpf (0-5)   07/10/18  11:35    


 


Urine Bacteria  Rare  (<OCC)   07/10/18  11:35    


 


Fluid Type  Spinal fluid   07/10/18  16:00    


 


CSF Volume  1 mL (0-1)   07/10/18  16:00    


 


CSF Appearance  Clear/colorless  (CLEAR)   07/10/18  16:00    


 


CSF WBC  1.0 /mm3 (0.0-5.0)   07/10/18  16:00    


 


CSF RBC  0.0 /mm3 (0.0-0.0)   07/10/18  16:00    


 


CSF Total Cell Counted  22  (0-0)  H  07/10/18  16:00    


 


CSF Neutrophils  1 % (0-0)  H  07/10/18  16:00    


 


CSF Lymphocytes  16.0 % (0-0)  H  07/10/18  16:00    


 


CSF Monos/Macrophages  1 % (0-0)  H  07/10/18  16:00    


 


CSF Comment  Clear   07/10/18  16:00    


 


CSF Glucose  74 mg/dL (40-70)  H  07/10/18  16:00    


 


CSF Total Protein  55.0 mg/dL (12-60)   07/10/18  16:00    


 


CSF VDRL  Nonreactive  (Nonreactive)   07/10/18  16:00    


 


HSV Source Description  Csf   07/10/18  16:00    


 


HSV I DNA PCR  Not detected  (Not Detected)   07/10/18  16:00    


 


HSV II DNA PCR  Not detected  (Not Detected)   07/10/18  16:00    














- Hospital Course


Hospital Course: 








72 y/o female with hx of DM type II, Pancreatic cyst , was brought in bec of 

fever , chills and headache.  In the ED found to have fever and leukocytosis.  

Lumbar Puncture was done  and CSF Cell Count showed WBC=22 with lymphocyte 

predominace, CSF Glucose and protein normal.





Patient evaluted by ID, patient may be discharged home with three days of 

Levaquin.








(1) Fever with chills, Leukocytosis and Headache


Status: Acute   


unclear etiology


question of Aseptic Meningitis


- check CSF   HSV, Enterovirus


- empirically started on IV Ceftriaxone, Vanco and Acyclovir 


- ID consulted - Dr Edgar rec to d/c IV Vanco   and Ceftriaxone since CSF 

analysis was not compatible with Bacterial Meningitis


- rec to start IV Zosyn and to cont IV Acyclovir- DISCONTINUE CHANGE TO PO 

LEVAQUIN


- Pancultured - Blood c/s  negative so far,   CSF : neg Gram stain,   Urine c/s 

: multiple species


- CXR showed Atelectasis , scarring- however pt has no resp sxs.- no cough - pt 

started on IV Zosyn


- at present pt is afebrile, denies HA, no CNS sxs, no cough, no dysuria, no 

abd pain





(2) Pancreatic Cyst


Status: Chronic   


CT scan showed Pancreatic cyst - this has been present since 2014 , pt has had 

CT scan to ff up this cyst and was eval by GI, pt denies abd pain


ff up with Primary care physician





(3) DMII (diabetes mellitus, type 2)


Status: Chronic


accucheck with coverage


cont Metformin





4. HTN


cont Lisinopril





5.  Right thigh pain, resolved 


Doppler Us  ruled out DVT


Femur xray : no fracture





DVT proph


-Lovenox








Discharge Exam





- Head Exam


Additional comments: 





Vitals Reviewed


GEN: WDWN, alert, cooperative


HEENT: NCAT, PERRL, EOMI


HEART: RRR, +S1S2, NO MRG


LUNG: CTAB, NO WRR


ABD: soft, NT, ND, No HSM, No masses


EXT: normal pedal pulses, normal capillary refill


NEURO: awake, alert, no focal deficits


SKIN: warm, dry


PSYCH: normal mood, normal affect





Discharge Plan





- Discharge Medications


Prescriptions: 


Levofloxacin [Levaquin] 500 mg PO DAILY #3 tablet





- Follow Up Plan


Condition: STABLE


Disposition: HOME/ ROUTINE


Instructions:  Fever, Adult (DC)


Additional Instructions: 


ran con boswell doctor primario o la clinica. 


Referrals: 


[facility] [place] [Outside]

## 2018-07-13 NOTE — CP.PCM.PN
Subjective





- Date & Time of Evaluation


Date of Evaluation: 07/13/18


Time of Evaluation: 08:35





Objective





- Vital Signs/Intake and Output


Vital Signs (last 24 hours): 


 











Temp Pulse Resp BP Pulse Ox


 


 98.1 F   66   20   121/65   97 


 


 07/13/18 08:02  07/13/18 08:02  07/13/18 08:02  07/13/18 08:02  07/13/18 08:02











- Medications


Medications: 


 Current Medications





Acetaminophen (Tylenol 325mg Tab)  650 mg PO Q6 PRN


   PRN Reason: Pain, Mild (1-3)


Acetaminophen (Tylenol 325mg Tab)  650 mg PO Q6 PRN


   PRN Reason: Fever >100.4 F


Aspirin (Ecotrin)  81 mg PO DAILY [facility]


   Last Admin: 07/12/18 09:24 Dose:  81 mg


Atorvastatin Calcium (Lipitor)  20 mg PO DAILY [facility]


   Last Admin: 07/12/18 09:24 Dose:  20 mg


Docusate Sodium (Colace)  100 mg PO BID [facility]


   Last Admin: 07/12/18 16:32 Dose:  Not Given


Enoxaparin Sodium (Lovenox)  40 mg SC DAILY [facility]


   PRN Reason: Protocol


   Last Admin: 07/12/18 09:25 Dose:  40 mg


Sodium Chloride (Sodium Chloride 0.9%)  1,000 mls @ 100 mls/hr IV .Q10H [facility]


   Last Admin: 07/13/18 05:15 Dose:  Not Given


Acyclovir 500 mg/ Sodium (Chloride)  100 mls @ 100 mls/hr IVPB Q8 [facility]


   PRN Reason: Protocol


   Last Admin: 07/13/18 00:52 Dose:  100 mls/hr


Piperacillin Sod/Tazobactam (Sod 3.375 gm/ Sodium Chloride)  100 mls @ 100 mls/

hr IVPB Q6 [facility]


   PRN Reason: Protocol


   Last Admin: 07/13/18 03:34 Dose:  100 mls/hr


Lisinopril (Zestril)  5 mg PO DAILY [facility]


   Last Admin: 07/12/18 09:32 Dose:  5 mg


Metformin HCl (Glucophage)  1,000 mg PO BID [facility]


   Last Admin: 07/12/18 16:28 Dose:  1,000 mg


Pantoprazole Sodium (Protonix Ec Tab)  40 mg PO DAILY [facility]


   Last Admin: 07/12/18 09:26 Dose:  40 mg











- Labs


Labs: 


 





 07/13/18 05:35 





 07/13/18 05:35 





 











PT  11.5 Seconds (9.8-13.1)   07/10/18  11:05    


 


INR  1.0  (0.9-1.2)   07/10/18  11:05    


 


APTT  32.1 Seconds (25.6-37.1)   07/10/18  11:05    














Assessment and Plan





- Assessment and Plan (Free Text)


Plan: 








72 y/o female with hx of DM type II, Pancreatic cyst , was brought in bec of 

fever , chills and headache.  In the ED found to have fever and leukocytosis.  

Lumbar Puncture was done  and CSF Cell Count showed WBC=22 with lymphocyte 

predominace, CSF Glucose and protein normal.








(1) Fever with chills, Leukocytosis and Headache


Status: Acute   


unclear etiology


question of Aseptic Meningitis


- check CSF   HSV, Enterovirus


- empirically started on IV Ceftriaxone, Vanco and Acyclovir 


- ID consulted - Dr Edgar rec to d/c IV Vanco   and Ceftriaxone since CSF 

analysis was not compatible with Bacterial Meningitis


- rec to start IV Zosyn and to cont IV Acyclovir


- Pancultured - Blood c/s  negative so far,   CSF : neg Gram stain,   Urine c/s 

: multiple species


- CXR showed Atelectasis , scarring- however pt has no resp sxs.- no cough - pt 

started on IV Zosyn


- at present pt is afebrile, denies HA, no CNS sxs, no cough, no dysuria, no 

abd pain





(2) Pancreatic Cyst


Status: Chronic   


CT scan showed Pancreatic cyst - this has been present since 2014 , pt has had 

CT scan to ff up this cyst and was eval by GI, pt denies abd pain


ff up with Primary care physician





(3) DMII (diabetes mellitus, type 2)


Status: Chronic


accucheck with coverage


cont Metformin





4. HTN


cont Lisinopril





5.  Right thigh pain, resolved 


Doppler Us  ruled out DVT


Femur xray : no fracture





DVT proph


-Lovenox

## 2018-07-14 LAB
ALPHA1 GLOB CSF ELPH-MCNC: 5.3 % (ref 1.8–6.5)
ALPHA2 GLOB CSF ELPH-MCNC: 7.9 % (ref 4.6–10.8)
B-GLOBULIN CSF ELPH-MCNC: 13.7 % (ref 7.8–18.2)
GAMMA GLOB CSF ELPH-MCNC: 10.6 % (ref 4.8–17.6)
PREALB CSF ELPH-MCNC: 3 % (ref 1.3–6.9)

## 2018-07-18 ENCOUNTER — HOSPITAL ENCOUNTER (EMERGENCY)
Dept: HOSPITAL 14 - H.ER | Age: 73
Discharge: HOME | End: 2018-07-18
Payer: SELF-PAY

## 2018-07-18 VITALS
SYSTOLIC BLOOD PRESSURE: 126 MMHG | HEART RATE: 71 BPM | OXYGEN SATURATION: 98 % | DIASTOLIC BLOOD PRESSURE: 62 MMHG | RESPIRATION RATE: 18 BRPM | TEMPERATURE: 98.6 F

## 2018-07-18 DIAGNOSIS — R10.9: Primary | ICD-10-CM

## 2018-07-18 DIAGNOSIS — Z79.82: ICD-10-CM

## 2018-07-18 DIAGNOSIS — E11.9: ICD-10-CM

## 2018-07-18 DIAGNOSIS — K85.90: ICD-10-CM

## 2018-07-18 DIAGNOSIS — E78.00: ICD-10-CM

## 2018-07-18 DIAGNOSIS — I10: ICD-10-CM

## 2018-07-18 LAB
ALBUMIN SERPL-MCNC: 4.3 G/DL (ref 3.5–5)
ALBUMIN/GLOB SERPL: 1.2 {RATIO} (ref 1–2.1)
ALT SERPL-CCNC: 32 U/L (ref 9–52)
APTT BLD: 33.4 SECONDS (ref 25.6–37.1)
AST SERPL-CCNC: 31 U/L (ref 14–36)
BASOPHILS # BLD AUTO: 0 K/UL (ref 0–0.2)
BASOPHILS NFR BLD: 0.5 % (ref 0–2)
BUN SERPL-MCNC: 21 MG/DL (ref 7–17)
CALCIUM SERPL-MCNC: 9.7 MG/DL (ref 8.4–10.2)
EOSINOPHIL # BLD AUTO: 0.4 K/UL (ref 0–0.7)
EOSINOPHIL NFR BLD: 4.3 % (ref 0–4)
ERYTHROCYTE [DISTWIDTH] IN BLOOD BY AUTOMATED COUNT: 13.6 % (ref 11.5–14.5)
GFR NON-AFRICAN AMERICAN: > 60
HGB BLD-MCNC: 12.8 G/DL (ref 12–16)
INR PPP: 1 (ref 0.9–1.2)
LYMPHOCYTES # BLD AUTO: 2.4 K/UL (ref 1–4.3)
LYMPHOCYTES NFR BLD AUTO: 25.6 % (ref 20–40)
MCH RBC QN AUTO: 31.6 PG (ref 27–31)
MCHC RBC AUTO-ENTMCNC: 32.7 G/DL (ref 33–37)
MCV RBC AUTO: 96.4 FL (ref 81–99)
MONOCYTES # BLD: 0.5 K/UL (ref 0–0.8)
MONOCYTES NFR BLD: 5.3 % (ref 0–10)
NEUTROPHILS # BLD: 5.9 K/UL (ref 1.8–7)
NEUTROPHILS NFR BLD AUTO: 64.3 % (ref 50–75)
NRBC BLD AUTO-RTO: 0 % (ref 0–0)
PLATELET # BLD: 379 K/UL (ref 130–400)
PMV BLD AUTO: 8.2 FL (ref 7.2–11.7)
PROTHROMBIN TIME: 10.9 SECONDS (ref 9.8–13.1)
RBC # BLD AUTO: 4.07 MIL/UL (ref 3.8–5.2)
WBC # BLD AUTO: 9.2 K/UL (ref 4.8–10.8)

## 2018-07-18 PROCEDURE — 80053 COMPREHEN METABOLIC PANEL: CPT

## 2018-07-18 PROCEDURE — 85610 PROTHROMBIN TIME: CPT

## 2018-07-18 PROCEDURE — 99281 EMR DPT VST MAYX REQ PHY/QHP: CPT

## 2018-07-18 PROCEDURE — 85025 COMPLETE CBC W/AUTO DIFF WBC: CPT

## 2018-07-18 PROCEDURE — 85730 THROMBOPLASTIN TIME PARTIAL: CPT

## 2018-07-18 NOTE — ED PDOC
HPI: Abdomen


Time Seen by Provider: 07/18/18 16:57


Chief Complaint (Nursing): Abnormal Labs





Past Medical History


Vital Signs: 





 Last Vital Signs











Temp  97.8 F   07/18/18 16:24


 


Pulse  68   07/18/18 16:24


 


Resp  16   07/18/18 16:24


 


BP  116/60   07/18/18 16:24


 


Pulse Ox  96   07/18/18 16:24














- Medical History


PMH: Diabetes, HTN, Hypercholesterolemia, Pancreatitis


   Denies: HIV, Chronic Kidney Disease





- Surgical History


Surgical History: Cholecystectomy





- Family History


Family History: States: Unknown Family Hx





- Home Medications


Home Medications: 


 Ambulatory Orders











 Medication  Instructions  Recorded


 


Lisinopril [Zestril] 5 mg PO DAILY 03/28/17


 


Metformin HCl [Glucophage] 1,000 mg PO BID 03/28/17


 


Simvastatin [Zocor] 40 mg PO DAILY 03/28/17


 


Aspirin [Ecotrin] 81 mg PO DAILY 07/10/18


 


Dicyclomine [Bentyl] 20 mg PO QID PRN #20 tab 07/18/18














- Allergies


Allergies/Adverse Reactions: 


 Allergies











Allergy/AdvReac Type Severity Reaction Status Date / Time


 


No Known Allergies Allergy   Verified 07/18/18 16:23














- Laboratory Results


Result Diagrams: 


 07/18/18 17:35





 07/18/18 17:35





- ECG


O2 Sat by Pulse Oximetry: 96





Disposition





- Clinical Impression


Clinical Impression: 


 Abdominal pain








- Disposition


Referrals: 


[facility] [Outside] - 07/20/18


Disposition: Routine/Home


Disposition Time: 18:34


Condition: STABLE


Prescriptions: 


Dicyclomine [Bentyl] 20 mg PO QID PRN #20 tab


 PRN Reason: abdominal pain


Instructions:  Acute Abdomen (Belly Pain)


Print Language: Croatian

## 2018-09-05 ENCOUNTER — HOSPITAL ENCOUNTER (OUTPATIENT)
Dept: HOSPITAL 14 - H.ER | Age: 73
Setting detail: OBSERVATION
LOS: 2 days | Discharge: HOME | End: 2018-09-07
Attending: GENERAL ACUTE CARE HOSPITAL | Admitting: GENERAL ACUTE CARE HOSPITAL
Payer: MEDICAID

## 2018-09-05 VITALS — BODY MASS INDEX: 29.2 KG/M2

## 2018-09-05 DIAGNOSIS — I10: ICD-10-CM

## 2018-09-05 DIAGNOSIS — K59.00: ICD-10-CM

## 2018-09-05 DIAGNOSIS — E78.00: ICD-10-CM

## 2018-09-05 DIAGNOSIS — B37.2: ICD-10-CM

## 2018-09-05 DIAGNOSIS — Z85.41: ICD-10-CM

## 2018-09-05 DIAGNOSIS — E11.9: ICD-10-CM

## 2018-09-05 DIAGNOSIS — Z92.21: ICD-10-CM

## 2018-09-05 DIAGNOSIS — N30.90: ICD-10-CM

## 2018-09-05 DIAGNOSIS — A41.9: Primary | ICD-10-CM

## 2018-09-05 DIAGNOSIS — K86.9: ICD-10-CM

## 2018-09-05 DIAGNOSIS — Z92.3: ICD-10-CM

## 2018-09-05 DIAGNOSIS — Z85.42: ICD-10-CM

## 2018-09-05 DIAGNOSIS — M48.061: ICD-10-CM

## 2018-09-05 LAB
ALBUMIN SERPL-MCNC: 4.3 G/DL (ref 3.5–5)
ALBUMIN/GLOB SERPL: 1.2 {RATIO} (ref 1–2.1)
ALT SERPL-CCNC: 19 U/L (ref 9–52)
APTT BLD: 32.6 SECONDS (ref 25.6–37.1)
AST SERPL-CCNC: 26 U/L (ref 14–36)
BACTERIA #/AREA URNS HPF: (no result) /[HPF]
BASE EXCESS BLDV CALC-SCNC: 3.2 MMOL/L (ref 0–2)
BASOPHILS # BLD AUTO: 0 K/UL (ref 0–0.2)
BASOPHILS NFR BLD: 0.1 % (ref 0–2)
BILIRUB UR-MCNC: NEGATIVE MG/DL
BUN SERPL-MCNC: 13 MG/DL (ref 7–17)
CALCIUM SERPL-MCNC: 9.4 MG/DL (ref 8.4–10.2)
COLOR UR: YELLOW
EOSINOPHIL # BLD AUTO: 0 K/UL (ref 0–0.7)
EOSINOPHIL NFR BLD: 0.2 % (ref 0–4)
ERYTHROCYTE [DISTWIDTH] IN BLOOD BY AUTOMATED COUNT: 13.7 % (ref 11.5–14.5)
GFR NON-AFRICAN AMERICAN: > 60
GLUCOSE UR STRIP-MCNC: (no result) MG/DL
HGB BLD-MCNC: 12.6 G/DL (ref 12–16)
INR PPP: 1.1
LEUKOCYTE ESTERASE UR-ACNC: (no result) LEU/UL
LYMPHOCYTE: 5 % (ref 20–50)
LYMPHOCYTES # BLD AUTO: 0.7 K/UL (ref 1–4.3)
LYMPHOCYTES NFR BLD AUTO: 3.8 % (ref 20–40)
MCH RBC QN AUTO: 31.7 PG (ref 27–31)
MCHC RBC AUTO-ENTMCNC: 33.4 G/DL (ref 33–37)
MCV RBC AUTO: 94.9 FL (ref 81–99)
MONOCYTE: 4 % (ref 0–10)
MONOCYTES # BLD: 0.8 K/UL (ref 0–0.8)
MONOCYTES NFR BLD: 4.5 % (ref 0–10)
NEUTROPHILS # BLD: 16.2 K/UL (ref 1.8–7)
NEUTROPHILS NFR BLD AUTO: 87 % (ref 42–75)
NEUTROPHILS NFR BLD AUTO: 91.4 % (ref 50–75)
NEUTS BAND NFR BLD: 4 % (ref 0–2)
NRBC BLD AUTO-RTO: 0 % (ref 0–0)
PCO2 BLDV: 42 MMHG (ref 40–60)
PH BLDV: 7.43 [PH] (ref 7.32–7.43)
PH UR STRIP: 6 [PH] (ref 5–8)
PLATELET # BLD EST: NORMAL 10*3/UL
PLATELET # BLD: 248 K/UL (ref 130–400)
PMV BLD AUTO: 8.7 FL (ref 7.2–11.7)
PROT UR STRIP-MCNC: 30 MG/DL
PROTHROMBIN TIME: 11.7 SECONDS (ref 9.8–13.1)
RBC # BLD AUTO: 3.98 MIL/UL (ref 3.8–5.2)
RBC # UR STRIP: NEGATIVE /UL
RBC MORPH BLD: NORMAL
SP GR UR STRIP: 1.02 (ref 1–1.03)
SQUAMOUS EPITHIAL: 1 /HPF (ref 0–5)
TOTAL CELLS COUNTED BLD: 100
URINE CLARITY: (no result)
UROBILINOGEN UR-MCNC: (no result) MG/DL (ref 0.2–1)
VENOUS BLOOD FIO2: 21 %
VENOUS BLOOD GAS PO2: 37 MM/HG (ref 30–55)
WBC # BLD AUTO: 17.7 K/UL (ref 4.8–10.8)

## 2018-09-05 PROCEDURE — 83735 ASSAY OF MAGNESIUM: CPT

## 2018-09-05 PROCEDURE — 85027 COMPLETE CBC AUTOMATED: CPT

## 2018-09-05 PROCEDURE — 93005 ELECTROCARDIOGRAM TRACING: CPT

## 2018-09-05 PROCEDURE — 81003 URINALYSIS AUTO W/O SCOPE: CPT

## 2018-09-05 PROCEDURE — 80053 COMPREHEN METABOLIC PANEL: CPT

## 2018-09-05 PROCEDURE — 87086 URINE CULTURE/COLONY COUNT: CPT

## 2018-09-05 PROCEDURE — 71045 X-RAY EXAM CHEST 1 VIEW: CPT

## 2018-09-05 PROCEDURE — 85610 PROTHROMBIN TIME: CPT

## 2018-09-05 PROCEDURE — 82803 BLOOD GASES ANY COMBINATION: CPT

## 2018-09-05 PROCEDURE — 85730 THROMBOPLASTIN TIME PARTIAL: CPT

## 2018-09-05 PROCEDURE — 74177 CT ABD & PELVIS W/CONTRAST: CPT

## 2018-09-05 PROCEDURE — 85025 COMPLETE CBC W/AUTO DIFF WBC: CPT

## 2018-09-05 PROCEDURE — 36415 COLL VENOUS BLD VENIPUNCTURE: CPT

## 2018-09-05 PROCEDURE — 87040 BLOOD CULTURE FOR BACTERIA: CPT

## 2018-09-05 PROCEDURE — 99285 EMERGENCY DEPT VISIT HI MDM: CPT

## 2018-09-05 PROCEDURE — 84100 ASSAY OF PHOSPHORUS: CPT

## 2018-09-05 PROCEDURE — 84145 PROCALCITONIN (PCT): CPT

## 2018-09-05 PROCEDURE — 80048 BASIC METABOLIC PNL TOTAL CA: CPT

## 2018-09-05 PROCEDURE — 82948 REAGENT STRIP/BLOOD GLUCOSE: CPT

## 2018-09-05 PROCEDURE — 96360 HYDRATION IV INFUSION INIT: CPT

## 2018-09-05 PROCEDURE — 87804 INFLUENZA ASSAY W/OPTIC: CPT

## 2018-09-05 NOTE — ED PDOC
- Laboratory Results


Result Diagrams: 


 09/05/18 09:33





 09/05/18 09:33





- ECG


O2 Sat by Pulse Oximetry: 95 (RA)


Pulse Ox Interpretation: Normal





Medical Decision Making


Medical Decision Making: 


Time: 1500


Patient signed out to me by Dr. Schulte pending CT Abdomen/Pelvis.





Time: 1549


CT Abdomen/Pelvis FINDINGS:





LOWER THORAX:


Unremarkable. 





LIVER:


 Hepatic steatosis. No focal masses.  No intrahepatic bile duct dilatation or 

perihepatic ascites.  





GALLBLADDER AND BILE DUCTS:


Status post cholecystectomy. No abnormality is seen in the gallbladder fossa.  





PANCREAS:


Cystic mass in the body of the pancreas measuring 15 mm. On the prior study 

this measured 14 x 14.5 mm.





SPLEEN:


Unremarkable. 





ADRENALS:


Unremarkable. No mass. 





KIDNEYS AND URETERS:


Unremarkable. No hydronephrosis. No solid mass. 





VASCULATURE:


Unremarkable. No aortic aneurysm. 





BOWEL:


Constipation without fecal impaction or obstruction.  





APPENDIX:


Normal appendix. 





PERITONEUM:


Unremarkable. No free fluid. No free air. 





LYMPH NODES:


Unremarkable. No enlarged lymph nodes. 





BLADDER:


Fell the bladder is partially decompressed.  Nonetheless bladder wall 

thickening and perivesical inflammatory changes likely reflect cystitis. 





REPRODUCTIVE:


Unremarkable. 





BONES:


No acute fracture. Spinal stenosis identified at L3-4, L4-5. 





OTHER FINDINGS:


None.





IMPRESSION:


Evidence of mild cystitis. No focal bladder wall abnormalities identified. 





Stable cystic mass in the body of the pancreas 1.5 cm.





Additional benign and/or incidental findings described above.





Time: 1603


Case discussed with Dr. Villalobos, hospitalist and patient to be admitted under 

him due to SIRS.





Scribe Attestation:


Documented by Sarah Scott, acting as a scribe for Keara Cox MD.





Provider Scribe Attestation:


All medical record entries made by the Scribe were at my direction and 

personally dictated by me. I have reviewed the chart and agree that the record 

accurately reflects my personal performance of the history, physical exam, 

medical decision making, and the department course for this patient. I have 

also personally directed, reviewed, and agree with the discharge instructions 

and disposition.








Disposition





- Clinical Impression


Clinical Impression: 


 SIRS (systemic inflammatory response syndrome)








- POA


Present On Arrival: None





- Disposition


Disposition: Hospitalized as Observation Patient


Disposition Time: 16:05


Condition: FAIR


Forms:  [employer] (English)

## 2018-09-05 NOTE — CARD
--------------- APPROVED REPORT --------------





Date of service: 09/05/2018



<Conclusion>

Sinus tachycardia

Otherwise normal ECG

## 2018-09-05 NOTE — ED PDOC
HPI: General Adult


Time Seen by Provider: 09/05/18 09:30


Chief Complaint (Nursing): Flu-like Symptoms


Chief Complaint (Provider): Bodyaches


History Per: Patient,  ([id number])


History/Exam Limitations: no limitations


Onset/Duration Of Symptoms: Days


Have you had recent travel within the past 21 days to any of the following 

countries: [place], [place], [place] or [place]?: No


Current Symptoms Are (Timing): Still Present


Additional History Per: Patient


Additional Complaint(s): 


74yo female, history of diabetes, comes to ER with complaints of headache, arm 

pain, abdominal pain, back pain and leg pain. Per patient's daughter, she has 

had a fever since 6AM today and was given Advil prior to arrival. Patient 

denies any associated chest pain, shortness of breath, cough, nausea, vomiting, 

diarrhea, dysuria or incontinence. No known sick contacts or recent travels as 

well. 





PMD: [facility]





Past Medical History


Reviewed: Historical Data, Nursing Documentation, Vital Signs


Vital Signs: 


 Last Vital Signs











Temp  98.7 F   09/07/18 08:00


 


Pulse  85   09/07/18 09:00


 


Resp  20   09/07/18 08:00


 


BP  106/61   09/07/18 08:00


 


Pulse Ox  97   09/07/18 08:00














- Medical History


PMH: Diabetes, HTN, Hypercholesterolemia, Pancreatitis


   Denies: HIV, Chronic Kidney Disease





- Surgical History


Surgical History: Cholecystectomy





- Family History


Family History: States: No Known Family Hx





- Living Arrangements


Living Arrangements: With Family





- Social History


Current smoker - smoking cessation education provided: No


Alcohol: None


Drugs: Denies





- Home Medications


Home Medications: 


 Ambulatory Orders











 Medication  Instructions  Recorded


 


Aspirin [Ecotrin] 81 mg PO DAILY #30 tabec 09/07/18


 


Ciprofloxacin [Cipro] 500 mg PO BID #14 tab 09/07/18


 


Lisinopril [Zestril] 5 mg PO DAILY #30 tab 09/07/18


 


Nystatin 60 gm TP TID #1 powder 09/07/18














- Allergies


Allergies/Adverse Reactions: 


 Allergies











Allergy/AdvReac Type Severity Reaction Status Date / Time


 


No Known Allergies Allergy   Verified 07/18/18 16:23














Review of Systems


ROS Statement: Except As Marked, All Systems Reviewed And Found Negative


Constitutional: Positive for: Fever


Cardiovascular: Negative for: Chest Pain


Respiratory: Negative for: Cough, Shortness of Breath


Gastrointestinal: Positive for: Abdominal Pain.  Negative for: Nausea, Vomiting

, Diarrhea


Genitourinary Female: Negative for: Dysuria, Frequency, Incontinence, Hematuria


Musculoskeletal: Positive for: Arm Pain, Back Pain, Leg Pain


Neurological: Negative for: Weakness, Numbness





Physical Exam





- Reviewed


Nursing Documentation Reviewed: Yes


Vital Signs Reviewed: Yes





- Physical Exam


Appears: Positive for: Non-toxic


Head Exam: Positive for: ATRAUMATIC, NORMAL INSPECTION, NORMOCEPHALIC


Skin: Positive for: Normal Color, Warm, DRY


Eye Exam: Positive for: EOMI, Normal appearance, PERRL


ENT: Positive for: Normal ENT Inspection


Neck: Positive for: Painless ROM, Supple


Cardiovascular/Chest: Positive for: Regular Rate, Rhythm, Chest Non Tender


Respiratory: Positive for: Normal Breath Sounds.  Negative for: Wheezing


Gastrointestinal/Abdominal: Positive for: Normal Exam, Soft.  Negative for: 

Tenderness


Back: Positive for: Normal Inspection.  Negative for: L CVA Tenderness, R CVA 

Tenderness


Extremity: Positive for: Normal ROM.  Negative for: Pedal Edema, Deformity


Neurologic/Psych: Positive for: Alert, Oriented.  Negative for: Motor/Sensory 

Deficits





- Laboratory Results


Result Diagrams: 


 09/07/18 09:11





 09/06/18 05:20





- ECG


O2 Sat by Pulse Oximetry: 95 (RA)


Pulse Ox Interpretation: Normal





Medical Decision Making


Medical Decision Making: 


Impression: Bodyaches, fever rule out flu, rule out infection, rule out 

electrolyte abnormality


Plan:


* Labs


* Tylenol 650mg PO


* IV Fluids


* Urinalysis


* Urine culture


* Blood culture


* Rapid flu





Time: 1040


Rapid flu test negative








Prior notes reviewed, patient with history of sepsis and pancreatic mass.





Time: 1231


On reassessment, patient complaining of diffuse abdominal pain. 


Abdominal exam shows abdomen soft, non-tender and non-distended.


CT Abdomen/Pelvis ordered for further evaluation.





Time: 1326


Chest x-ray FINDINGS:





LUNGS:


No active pulmonary disease.





PLEURA:


No significant pleural effusion identified, no pneumothorax apparent.





CARDIOVASCULAR:


 No radiographic findings to suggest acute or significant cardiovascular 

disease.





OSSEOUS STRUCTURES:


No significant abnormalities.





VISUALIZED UPPER ABDOMEN:


Normal.





OTHER FINDINGS:


None.





IMPRESSION:


No active disease. No significant interval change compared to the prior 

examination(s).





Time: 1500


Patient signed out to Dr. Cox pending CT Abdomen/Pelvis.





Scribe Attestation:


Documented by Sarah Tyler, acting as a scribe for Janette Schulte MD





Provider Scribe Attestation:


All medical record entries made by the Scribe were at my direction and 

personally dictated by me. I have reviewed the chart and agree that the record 

accurately reflects my personal performance of the history, physical exam, 

medical decision making, and the department course for this patient. I have 

also personally directed, reviewed, and agree with the discharge instructions 

and disposition.








Disposition





- Clinical Impression


Clinical Impression: 


 SIRS (systemic inflammatory response syndrome)








- Patient ED Disposition


Is Patient to be Admitted: Transfer of Care





- Disposition


Disposition: Transfer of Care


Disposition Time: 15:00


Condition: STABLE


Patient Signed Over To: Darian Cox

## 2018-09-05 NOTE — CP.PCM.HP
<Sachi Mireles - Last Filed: 18 18:55>





History of Present Illness





- History of Present Illness


History of Present Illness: 





HPI: 74 y/o Female with PMHx of DM type 2, who presents to the ED with c/o 

general "body aches" since early morning around 6AM today, lower abdominal pain

, chills, sujective fever, mild unspecific headache, and some nausea, patient 

states she took advil before coming to the ED but did not get complete relief 

and decided to come to the hospital with her daughter. Patient reports hx of 

Uterine Cervical cancer that was treated years ago with chemo and radiation, 

also she reports suffering from Urinary urgency/frequency for the last 6 MO, 

but denies dysuria/hematuria at this time. Patient reports an episode of 

hematuria x1 6 MO ago, but is not clear if she looked for medical attention at 

that time. Patient denies any associated symptoms of chest pain, SOB, cough, 

diarrhea, blood in stools, palpitations.





ROS: as per HPI


PMD: [facility]


PMH: DM type 2, Hx of Uterine CA/Cervical cancer (received chemo and radiation 

13 years ago).


OBGYN: 


FMH: Denies FMH


ALLERGIES: NKA


MEDS: Metformin ER 1000 mg PO QD


SURG: Cholecystectomy.


SOHx: Denies ETOH/smoking/ Rec drug use


Next of Kailyn: Daughter Latanya [phone]


Code status: Full Code.





ED course:


VS reviewed: Temp 101.7F, /62, , RR20


CBC: WBC 17.7 ( LEUKOCYTOSIS) , Hb 12.6, Hto 37.7,


Chemistry: unremarkable.


STOOL OB: Negative.


UA: unremarkable, Urine culture ordered and pending.


BC done: pending results.


Flu Test : Negative for influenza A/B


CXR: Reports no active disease, no significnat changes from prior studies.


CT Scan of Abdomen and Pelvis: Evidence of mild cystitis, no bladder wall 

abnormalities identified.











Present on Admission





- Present on Admission


Any Indicators Present on Admission: No


History of DVT/PE: No


History of Uncontrolled Diabetes: No


Urinary Catheter: No


Decubitus Ulcer Present: No


History Surgical Site Infection Following: None





Past Patient History





- Tetanus Immunizations


Tetanus Immunization: Unknown





- Past Medical History & Family History


Past Medical History?: Yes





- Past Social History


Alcohol: None


Drugs: Denies





- CARDIAC


Hx Hypercholesterolemia: Yes


Hx Hypertension: Yes





- PULMONARY


Hx Respiratory Disorders: No





- NEUROLOGICAL


Hx Neurological Disorder: No





- HEENT


Hx HEENT Problems: No





- RENAL


Hx Chronic Kidney Disease: No





- ENDOCRINE/METABOLIC


Hx Endocrine Disorders: Yes


Hx Diabetes Mellitus Type 2: Yes





- HEMATOLOGICAL/ONCOLOGICAL


Hx Human Immunodeficiency Virus (HIV): No





- INTEGUMENTARY


Hx Dermatological Problems: No





- MUSCULOSKELETAL/RHEUMATOLOGICAL


Hx Musculoskeletal Disorders: No





- GASTROINTESTINAL


Hx Pancreatitis: Yes





- GENITOURINARY/GYNECOLOGICAL


Hx Genitourinary Disorders: Yes


Hx Uterine Cancer: Yes (treated with Radiation and chemo 13 yrs ago)


Other/Comment: Hx cystitis





- PSYCHIATRIC


Hx Psychophysiologic Disorder: No


Hx Substance Use: No





- SURGICAL HISTORY


Hx Cholecystectomy: Yes





- ANESTHESIA


Hx Anesthesia: Yes


Hx Anesthesia Reactions: No


Hx Malignant Hyperthermia: No





Meds


Allergies/Adverse Reactions: 


 Allergies











Allergy/AdvReac Type Severity Reaction Status Date / Time


 


No Known Allergies Allergy   Verified 18 16:23














Physical Exam





- Constitutional


Appears: No Acute Distress





- Head Exam


Head Exam: ATRAUMATIC, NORMOCEPHALIC





- Eye Exam


Eye Exam: EOMI, PERRL





- ENT Exam


ENT Exam: Mucous Membranes Moist





- Respiratory Exam


Respiratory Exam: Clear to Auscultation Bilateral.  absent: Rales, Wheezes





- Cardiovascular Exam


Cardiovascular Exam: REGULAR RHYTHM, +S1, +S2





- GI/Abdominal Exam


GI & Abdominal Exam: Normal Bowel Sounds, Soft, Tenderness (mild tenderness to 

the hypogastric area).  absent: Distended, Guarding





- Extremities Exam


Extremities exam: Negative for: pedal edema





- Back Exam


Back exam: absent: CVA tenderness (L), CVA tenderness (R)





- Neurological Exam


Neurological exam: Alert, Oriented x3





- Psychiatric Exam


Psychiatric exam: Normal Affect, Normal Mood





- Skin


Skin Exam: Dry, Normal Color, Warm





Results





- Vital Signs


Recent Vital Signs: 





 Last Vital Signs











Temp  99.0 F   18 11:10


 


Pulse  89   18 11:10


 


Resp  21   18 11:10


 


BP  106/58 L  18 11:10


 


Pulse Ox  95   18 16:05














- Labs


Result Diagrams: 


 18 09:33





 18 09:33


Labs: 





 Laboratory Results - last 24 hr











  1818





  09:26 09:30 09:33


 


WBC    17.7 H D


 


RBC    3.98


 


Hgb    12.6


 


Hct    37.7


 


MCV    94.9


 


MCH    31.7 H


 


MCHC    33.4


 


RDW    13.7


 


Plt Count    248  D


 


MPV    8.7


 


Neut % (Auto)    91.4 H


 


Lymph % (Auto)    3.8 L


 


Mono % (Auto)    4.5


 


Eos % (Auto)    0.2


 


Baso % (Auto)    0.1


 


Neut # (Auto)    16.2 H


 


Lymph # (Auto)    0.7 L


 


Mono # (Auto)    0.8


 


Eos # (Auto)    0.0


 


Baso # (Auto)    0.0


 


Neutrophils % (Manual)    87 H


 


Band Neutrophils %    4 H


 


Lymphocytes % (Manual)    5 L


 


Monocytes % (Manual)    4


 


Platelet Estimate    Normal


 


RBC Morphology    Normal


 


PT   


 


INR   


 


APTT   


 


pO2   37 


 


VBG pH   7.43 


 


VBG pCO2   42 


 


VBG HCO3   26.8 


 


VBG Total CO2   29.2 H 


 


VBG O2 Sat (Calc)   80.1 H 


 


VBG Base Excess   3.2 H 


 


VBG Potassium   3.8 


 


Sodium   138.0 


 


Chloride   106.0 


 


Glucose   147 H 


 


Lactate   1.7 


 


FiO2   21.0 


 


Potassium   


 


Carbon Dioxide   


 


Anion Gap   


 


BUN   


 


Creatinine   


 


Est GFR ( Amer)   


 


Est GFR (Non-Af Amer)   


 


POC Glucose (mg/dL)  132 H  


 


Random Glucose   


 


Calcium   


 


Phosphorus   


 


Magnesium   


 


Total Bilirubin   


 


AST   


 


ALT   


 


Alkaline Phosphatase   


 


Total Protein   


 


Albumin   


 


Globulin   


 


Albumin/Globulin Ratio   


 


Venous Blood Potassium   3.8 


 


Urine Color   


 


Urine Clarity   


 


Urine pH   


 


Ur Specific Gravity   


 


Urine Protein   


 


Urine Glucose (UA)   


 


Urine Ketones   


 


Urine Blood   


 


Urine Nitrate   


 


Urine Bilirubin   


 


Urine Urobilinogen   


 


Ur Leukocyte Esterase   


 


Urine RBC (Auto)   


 


Urine Microscopic WBC   


 


Ur Squamous Epith Cells   


 


Urine Bacteria   


 


Influenza Typ A,B (EIA)   














  18





  09:33 09:33 09:50


 


WBC   


 


RBC   


 


Hgb   


 


Hct   


 


MCV   


 


MCH   


 


MCHC   


 


RDW   


 


Plt Count   


 


MPV   


 


Neut % (Auto)   


 


Lymph % (Auto)   


 


Mono % (Auto)   


 


Eos % (Auto)   


 


Baso % (Auto)   


 


Neut # (Auto)   


 


Lymph # (Auto)   


 


Mono # (Auto)   


 


Eos # (Auto)   


 


Baso # (Auto)   


 


Neutrophils % (Manual)   


 


Band Neutrophils %   


 


Lymphocytes % (Manual)   


 


Monocytes % (Manual)   


 


Platelet Estimate   


 


RBC Morphology   


 


PT   11.7 


 


INR   1.1 


 


APTT   32.6 


 


pO2   


 


VBG pH   


 


VBG pCO2   


 


VBG HCO3   


 


VBG Total CO2   


 


VBG O2 Sat (Calc)   


 


VBG Base Excess   


 


VBG Potassium   


 


Sodium  139  


 


Chloride  105  


 


Glucose   


 


Lactate   


 


FiO2   


 


Potassium  3.9  


 


Carbon Dioxide  23  


 


Anion Gap  15  


 


BUN  13  


 


Creatinine  0.5 L  


 


Est GFR ( Amer)  > 60  


 


Est GFR (Non-Af Amer)  > 60  


 


POC Glucose (mg/dL)   


 


Random Glucose  145 H  


 


Calcium  9.4  


 


Phosphorus  2.6  


 


Magnesium  1.6  


 


Total Bilirubin  0.5  


 


AST  26  


 


ALT  19  


 


Alkaline Phosphatase  85  


 


Total Protein  7.8  


 


Albumin  4.3  


 


Globulin  3.5  


 


Albumin/Globulin Ratio  1.2  


 


Venous Blood Potassium   


 


Urine Color   


 


Urine Clarity   


 


Urine pH   


 


Ur Specific Gravity   


 


Urine Protein   


 


Urine Glucose (UA)   


 


Urine Ketones   


 


Urine Blood   


 


Urine Nitrate   


 


Urine Bilirubin   


 


Urine Urobilinogen   


 


Ur Leukocyte Esterase   


 


Urine RBC (Auto)   


 


Urine Microscopic WBC   


 


Ur Squamous Epith Cells   


 


Urine Bacteria   


 


Influenza Typ A,B (EIA)    Negative for flu a/b














  18





  10:18


 


WBC 


 


RBC 


 


Hgb 


 


Hct 


 


MCV 


 


MCH 


 


MCHC 


 


RDW 


 


Plt Count 


 


MPV 


 


Neut % (Auto) 


 


Lymph % (Auto) 


 


Mono % (Auto) 


 


Eos % (Auto) 


 


Baso % (Auto) 


 


Neut # (Auto) 


 


Lymph # (Auto) 


 


Mono # (Auto) 


 


Eos # (Auto) 


 


Baso # (Auto) 


 


Neutrophils % (Manual) 


 


Band Neutrophils % 


 


Lymphocytes % (Manual) 


 


Monocytes % (Manual) 


 


Platelet Estimate 


 


RBC Morphology 


 


PT 


 


INR 


 


APTT 


 


pO2 


 


VBG pH 


 


VBG pCO2 


 


VBG HCO3 


 


VBG Total CO2 


 


VBG O2 Sat (Calc) 


 


VBG Base Excess 


 


VBG Potassium 


 


Sodium 


 


Chloride 


 


Glucose 


 


Lactate 


 


FiO2 


 


Potassium 


 


Carbon Dioxide 


 


Anion Gap 


 


BUN 


 


Creatinine 


 


Est GFR ( Amer) 


 


Est GFR (Non-Af Amer) 


 


POC Glucose (mg/dL) 


 


Random Glucose 


 


Calcium 


 


Phosphorus 


 


Magnesium 


 


Total Bilirubin 


 


AST 


 


ALT 


 


Alkaline Phosphatase 


 


Total Protein 


 


Albumin 


 


Globulin 


 


Albumin/Globulin Ratio 


 


Venous Blood Potassium 


 


Urine Color  Yellow


 


Urine Clarity  Slighty-cloudy


 


Urine pH  6.0


 


Ur Specific Gravity  1.021


 


Urine Protein  30


 


Urine Glucose (UA)  Neg


 


Urine Ketones  Negative


 


Urine Blood  Negative


 


Urine Nitrate  Negative


 


Urine Bilirubin  Negative


 


Urine Urobilinogen  0.2-1.0


 


Ur Leukocyte Esterase  Neg


 


Urine RBC (Auto)  1


 


Urine Microscopic WBC  1


 


Ur Squamous Epith Cells  1


 


Urine Bacteria  Rare


 


Influenza Typ A,B (EIA) 














Assessment & Plan





- Assessment and Plan (Free Text)


Assessment: 





74 y/o Female admitted for Sepsis likely secondary to Cystitis, with PMHx of 

NIDDM type 2, Uterine CA/cervical CA treated with chemo and radiation 13 years 

ago.





Plan:





Sepsis likely secondary to Cystitis


-Criteria met with : WBC 17.7, , Temp 101.7F


-CT scan of Abdomen/Pelvis: Evidence of mild cystitis, no bladder wall 

abnormalities identified.


-Admit to tele Observation


-IVF with  ml/h


-Rocephin 1 G IV QD


-F/U CBC w/ diff, BMP, UA in AM 


-BC x2 


-F/u Urine culture (results pending)


-Acetaminophen 650 mg PO Q6h PRN fever 





NIDDM type 2


-Heart healthy/ Diabetic diet


-Metformin SR 1000 mg PO QD





DVT Prophylaxis


-Lovenox 40 mg SC QD




















- Date & Time


Date: 18


Time: 17:05





<Huber Teresa D - Last Filed: 18 09:47>





Results





- Vital Signs


Recent Vital Signs: 





 Last Vital Signs











Temp  99.7 F H  18 08:55


 


Pulse  71   18 08:22


 


Resp  20   18 08:22


 


BP  149/89   18 08:22


 


Pulse Ox  98   18 08:22














- Labs


Result Diagrams: 


 18 05:20





 18 05:20


Labs: 





 Laboratory Results - last 24 hr











  18





  09:26 09:33 09:33


 


WBC   17.7 H D 


 


RBC   3.98 


 


Hgb   12.6 


 


Hct   37.7 


 


MCV   94.9 


 


MCH   31.7 H 


 


MCHC   33.4 


 


RDW   13.7 


 


Plt Count   248  D 


 


MPV   8.7 


 


Neut % (Auto)   91.4 H 


 


Lymph % (Auto)   3.8 L 


 


Mono % (Auto)   4.5 


 


Eos % (Auto)   0.2 


 


Baso % (Auto)   0.1 


 


Neut # (Auto)   16.2 H 


 


Lymph # (Auto)   0.7 L 


 


Mono # (Auto)   0.8 


 


Eos # (Auto)   0.0 


 


Baso # (Auto)   0.0 


 


Neutrophils % (Manual)   87 H 


 


Band Neutrophils %   4 H 


 


Lymphocytes % (Manual)   5 L 


 


Monocytes % (Manual)   4 


 


Platelet Estimate   Normal 


 


RBC Morphology   Normal 


 


PT   


 


INR   


 


APTT   


 


Sodium    139


 


Potassium    3.9


 


Chloride    105


 


Carbon Dioxide    23


 


Anion Gap    15


 


BUN    13


 


Creatinine    0.5 L


 


Est GFR ( Amer)    > 60


 


Est GFR (Non-Af Amer)    > 60


 


POC Glucose (mg/dL)  132 H  


 


Random Glucose    145 H


 


Calcium    9.4


 


Phosphorus    2.6


 


Magnesium    1.6


 


Total Bilirubin    0.5


 


AST    26


 


ALT    19


 


Alkaline Phosphatase    85


 


Total Protein    7.8


 


Albumin    4.3


 


Globulin    3.5


 


Albumin/Globulin Ratio    1.2


 


Urine Color   


 


Urine Clarity   


 


Urine pH   


 


Ur Specific Gravity   


 


Urine Protein   


 


Urine Glucose (UA)   


 


Urine Ketones   


 


Urine Blood   


 


Urine Nitrate   


 


Urine Bilirubin   


 


Urine Urobilinogen   


 


Ur Leukocyte Esterase   


 


Urine RBC (Auto)   


 


Urine Microscopic WBC   


 


Ur Squamous Epith Cells   


 


Urine Bacteria   


 


Influenza Typ A,B (EIA)   














  18





  09:33 09:50 10:18


 


WBC   


 


RBC   


 


Hgb   


 


Hct   


 


MCV   


 


MCH   


 


MCHC   


 


RDW   


 


Plt Count   


 


MPV   


 


Neut % (Auto)   


 


Lymph % (Auto)   


 


Mono % (Auto)   


 


Eos % (Auto)   


 


Baso % (Auto)   


 


Neut # (Auto)   


 


Lymph # (Auto)   


 


Mono # (Auto)   


 


Eos # (Auto)   


 


Baso # (Auto)   


 


Neutrophils % (Manual)   


 


Band Neutrophils %   


 


Lymphocytes % (Manual)   


 


Monocytes % (Manual)   


 


Platelet Estimate   


 


RBC Morphology   


 


PT  11.7  


 


INR  1.1  


 


APTT  32.6  


 


Sodium   


 


Potassium   


 


Chloride   


 


Carbon Dioxide   


 


Anion Gap   


 


BUN   


 


Creatinine   


 


Est GFR ( Amer)   


 


Est GFR (Non-Af Amer)   


 


POC Glucose (mg/dL)   


 


Random Glucose   


 


Calcium   


 


Phosphorus   


 


Magnesium   


 


Total Bilirubin   


 


AST   


 


ALT   


 


Alkaline Phosphatase   


 


Total Protein   


 


Albumin   


 


Globulin   


 


Albumin/Globulin Ratio   


 


Urine Color    Yellow


 


Urine Clarity    Slighty-cloudy


 


Urine pH    6.0


 


Ur Specific Gravity    1.021


 


Urine Protein    30


 


Urine Glucose (UA)    Neg


 


Urine Ketones    Negative


 


Urine Blood    Negative


 


Urine Nitrate    Negative


 


Urine Bilirubin    Negative


 


Urine Urobilinogen    0.2-1.0


 


Ur Leukocyte Esterase    Neg


 


Urine RBC (Auto)    1


 


Urine Microscopic WBC    1


 


Ur Squamous Epith Cells    1


 


Urine Bacteria    Rare


 


Influenza Typ A,B (EIA)   Negative for flu a/b 














  18





  05:20 05:20


 


WBC  13.6 H 


 


RBC  3.69 L 


 


Hgb  11.7 L 


 


Hct  35.5 


 


MCV  96.1 


 


MCH  31.8 H 


 


MCHC  33.1 


 


RDW  13.7 


 


Plt Count  232 


 


MPV  8.7 


 


Neut % (Auto)  88.0 H 


 


Lymph % (Auto)  7.6 L 


 


Mono % (Auto)  3.9 


 


Eos % (Auto)  0.2 


 


Baso % (Auto)  0.3 


 


Neut # (Auto)  12.0 H 


 


Lymph # (Auto)  1.0 


 


Mono # (Auto)  0.5 


 


Eos # (Auto)  0.0 


 


Baso # (Auto)  0.0 


 


Neutrophils % (Manual)  


 


Band Neutrophils %  


 


Lymphocytes % (Manual)  


 


Monocytes % (Manual)  


 


Platelet Estimate  


 


RBC Morphology  


 


PT  


 


INR  


 


APTT  


 


Sodium   139


 


Potassium   3.9


 


Chloride   108 H


 


Carbon Dioxide   27


 


Anion Gap   8 L


 


BUN   9


 


Creatinine   0.5 L


 


Est GFR ( Amer)   > 60


 


Est GFR (Non-Af Amer)   > 60


 


POC Glucose (mg/dL)  


 


Random Glucose   114 H


 


Calcium   8.9


 


Phosphorus  


 


Magnesium  


 


Total Bilirubin  


 


AST  


 


ALT  


 


Alkaline Phosphatase  


 


Total Protein  


 


Albumin  


 


Globulin  


 


Albumin/Globulin Ratio  


 


Urine Color  


 


Urine Clarity  


 


Urine pH  


 


Ur Specific Gravity  


 


Urine Protein  


 


Urine Glucose (UA)  


 


Urine Ketones  


 


Urine Blood  


 


Urine Nitrate  


 


Urine Bilirubin  


 


Urine Urobilinogen  


 


Ur Leukocyte Esterase  


 


Urine RBC (Auto)  


 


Urine Microscopic WBC  


 


Ur Squamous Epith Cells  


 


Urine Bacteria  


 


Influenza Typ A,B (EIA)  














Attending/Attestation





- Attestation


I have personally seen and examined this patient.: Yes


I have fully participated in the care of the patient.: Yes


I have reviewed all pertinent clinical information: Yes

## 2018-09-05 NOTE — RAD
Date of service: 



09/05/2018



HISTORY:

Sepsis Patient  



COMPARISON:

07/10/2018 



FINDINGS:



LUNGS:

No active pulmonary disease.



PLEURA:

No significant pleural effusion identified, no pneumothorax apparent.



CARDIOVASCULAR:

 No radiographic findings to suggest acute or significant 

cardiovascular disease.



OSSEOUS STRUCTURES:

No significant abnormalities.



VISUALIZED UPPER ABDOMEN:

Normal.



OTHER FINDINGS:

None.



IMPRESSION:

No active disease. No significant interval change compared to the 

prior examination(s).

## 2018-09-05 NOTE — CT
Date of service: 



09/05/2018



PROCEDURE:  CT Abdomen and Pelvis with contrast



HISTORY:

Abdominal pain. 



COMPARISON:

07/10/2018.  CT abdomen and pelvis



TECHNIQUE:

Contrast dose: 95 cc Omnipaque 300.



Radiation dose:



Total exam DLP = 602.31 blanca blanca mGy-cm.



This CT exam was performed using one or more of the following dose 

reduction techniques: Automated exposure control, adjustment of the 

mA and/or kV according to patient size, and/or use of iterative 

reconstruction technique.



FINDINGS:



LOWER THORAX:

Unremarkable. 



LIVER:

 Hepatic steatosis. No focal masses.  No intrahepatic bile duct 

dilatation or perihepatic ascites.  



GALLBLADDER AND BILE DUCTS:

Status post cholecystectomy. No abnormality is seen in the 

gallbladder fossa.  



PANCREAS:

Cystic mass in the body of the pancreas measuring 15 mm. On the prior 

study this measured 14 x 14.5 mm.



SPLEEN:

Unremarkable. 



ADRENALS:

Unremarkable. No mass. 



KIDNEYS AND URETERS:

Unremarkable. No hydronephrosis. No solid mass. 



VASCULATURE:

Unremarkable. No aortic aneurysm. 



BOWEL:

Constipation without fecal impaction or obstruction.  



APPENDIX:

Normal appendix. 



PERITONEUM:

Unremarkable. No free fluid. No free air. 



LYMPH NODES:

Unremarkable. No enlarged lymph nodes. 



BLADDER:

Fell the bladder is partially decompressed.  Nonetheless bladder wall 

thickening and perivesical inflammatory changes likely reflect 

cystitis. 



REPRODUCTIVE:

Unremarkable. 



BONES:

No acute fracture. Spinal stenosis identified at L3-4, L4-5. 



OTHER FINDINGS:

None.



IMPRESSION:

Evidence of mild cystitis. No focal bladder wall abnormalities 

identified. 



Stable cystic mass in the body of the pancreas 1.5 cm.



Additional benign and/or incidental findings described above.

## 2018-09-06 LAB
BASOPHILS # BLD AUTO: 0 K/UL (ref 0–0.2)
BASOPHILS NFR BLD: 0.3 % (ref 0–2)
BUN SERPL-MCNC: 9 MG/DL (ref 7–17)
CALCIUM SERPL-MCNC: 8.9 MG/DL (ref 8.4–10.2)
EOSINOPHIL # BLD AUTO: 0 K/UL (ref 0–0.7)
EOSINOPHIL NFR BLD: 0.2 % (ref 0–4)
ERYTHROCYTE [DISTWIDTH] IN BLOOD BY AUTOMATED COUNT: 13.7 % (ref 11.5–14.5)
GFR NON-AFRICAN AMERICAN: > 60
HGB BLD-MCNC: 11.7 G/DL (ref 12–16)
LYMPHOCYTES # BLD AUTO: 1 K/UL (ref 1–4.3)
LYMPHOCYTES NFR BLD AUTO: 7.6 % (ref 20–40)
MCH RBC QN AUTO: 31.8 PG (ref 27–31)
MCHC RBC AUTO-ENTMCNC: 33.1 G/DL (ref 33–37)
MCV RBC AUTO: 96.1 FL (ref 81–99)
MONOCYTES # BLD: 0.5 K/UL (ref 0–0.8)
MONOCYTES NFR BLD: 3.9 % (ref 0–10)
NEUTROPHILS # BLD: 12 K/UL (ref 1.8–7)
NEUTROPHILS NFR BLD AUTO: 88 % (ref 50–75)
NRBC BLD AUTO-RTO: 0 % (ref 0–0)
PLATELET # BLD: 232 K/UL (ref 130–400)
PMV BLD AUTO: 8.7 FL (ref 7.2–11.7)
RBC # BLD AUTO: 3.69 MIL/UL (ref 3.8–5.2)
WBC # BLD AUTO: 13.6 K/UL (ref 4.8–10.8)

## 2018-09-06 RX ADMIN — INSULIN LISPRO SCH: 100 INJECTION, SOLUTION INTRAVENOUS; SUBCUTANEOUS at 13:23

## 2018-09-06 RX ADMIN — INSULIN LISPRO SCH: 100 INJECTION, SOLUTION INTRAVENOUS; SUBCUTANEOUS at 17:23

## 2018-09-06 RX ADMIN — ENOXAPARIN SODIUM SCH MG: 40 INJECTION SUBCUTANEOUS at 08:42

## 2018-09-06 NOTE — CP.PCM.PN
<Jluis GuzmánSachi - Last Filed: 09/06/18 13:49>





Subjective





- Date & Time of Evaluation


Date of Evaluation: 09/06/18


Time of Evaluation: 08:50





- Subjective


Subjective: 





Patient seen and examined thia AM, she is NAD, she c/o unspecific abdominal pain

, that according to patient is mild in intensity 3/10 and described as a 

discomfort diffuse to the lower abdominal area. Patient denies chills today, HA

, N/V/D, dysuria, chest pain, palpitations. 





Objective





- Vital Signs/Intake and Output


Vital Signs (last 24 hours): 


 











Temp Pulse Resp BP Pulse Ox


 


 99.7 F H  71   20   149/89   98 


 


 09/06/18 08:55  09/06/18 08:22  09/06/18 08:22  09/06/18 08:22  09/06/18 08:22











- Medications


Medications: 


 Current Medications





Acetaminophen (Tylenol 325mg Tab)  650 mg PO Q6 PRN


   PRN Reason: Fever >100.4 F


   Last Admin: 09/05/18 17:23 Dose:  650 mg


Acetaminophen (Tylenol 325mg Tab)  650 mg PO Q6 PRN


   PRN Reason: Pain, Mild (1-3)


   Last Admin: 09/06/18 08:55 Dose:  650 mg


Dextrose (Dextrose 50% Inj)  0 ml IV STAT PRN; Protocol


   PRN Reason: Hypoglycemia Protocol


Dextrose (Glutose 15)  0 gm PO ONCE PRN; Protocol


   PRN Reason: Hypoglycemia Protocol


Dicyclomine HCl (Bentyl)  20 mg PO Q6 PRN


   PRN Reason: abdominal pain/cramps


Enoxaparin Sodium (Lovenox)  40 mg SC DAILY MADI


   PRN Reason: Protocol


   Last Admin: 09/06/18 08:42 Dose:  40 mg


Glucagon (Glucagen Diagnostic Kit)  0 mg IM STAT PRN; Protocol


   PRN Reason: Hypoglycemia Protocol


Sodium Chloride (Sodium Chloride 0.9%)  1,000 mls @ 100 mls/hr IV .Q10H [facility]


   Last Admin: 09/06/18 07:13 Dose:  100 mls/hr


Ceftriaxone Sodium 1 gm/ (Sodium Chloride)  100 mls @ 100 mls/hr IVPB DAILY MADI


   PRN Reason: Protocol


Insulin Human Lispro (Humalog)  0 units SC ACHS MADI


   PRN Reason: Protocol


Ondansetron HCl (Zofran Inj)  4 mg IVP Q6 PRN


   PRN Reason: Nausea/Vomiting











- Labs


Labs: 


 





 09/06/18 05:20 





 09/06/18 05:20 





 











PT  11.7 Seconds (9.8-13.1)   09/05/18  09:33    


 


INR  1.1   09/05/18  09:33    


 


APTT  32.6 Seconds (25.6-37.1)   09/05/18  09:33    














- Additional Findings


Additional findings: 





- Constitutional


Appears: No Acute Distress





- Head Exam


Head Exam: ATRAUMATIC, NORMOCEPHALIC





- Eye Exam


Eye Exam: EOMI, PERRL





- ENT Exam


ENT Exam: Mucous Membranes Moist





- Respiratory Exam


Respiratory Exam: Clear to Auscultation Bilateral.  absent: Rales, Wheezes





- Cardiovascular Exam


Cardiovascular Exam: REGULAR RHYTHM, +S1, +S2





- GI/Abdominal Exam


GI & Abdominal Exam: Normal Bowel Sounds, Soft, Tenderness is diffuse and mild (

mainly tenderness to the RLQ area today).  absent: Distended, Guarding





- Extremities Exam


Extremities exam: Negative for: pedal edema





- Back Exam


Back exam: absent: CVA tenderness (L), CVA tenderness (R)





- Neurological Exam


Neurological exam: Alert, Oriented x3





- Psychiatric Exam


Psychiatric exam: Normal Affect, Normal Mood





- Skin


Skin Exam: Dry, Normal Color, Warm








Assessment and Plan





- Assessment and Plan (Free Text)


Assessment: 





74 y/o Female admitted for Sepsis likely secondary to Cystitis, with PMHx of 

NIDDM type 2, Uterine CA/cervical CA treated with chemo and radiation 13 years 

ago.


Plan: 





Sepsis likely secondary to Cystitis


-Criteria met upon admission with WBC 17.7, Bandemia 4, , Temp 101.7F 


-F/U WBC 9/6/18: 13.6, Bandemia resolved.


-CT scan of Abdomen/Pelvis: Evidence of mild cystitis, no bladder wall 

abnormalities identified.


-UA unremarkable upon admission, will f/u repeat UA


-BC x2 first 24 h negative with pending gram stain.


-Will f/u Urine culture.


-Admit to tele


-VS 9/6 WNL, continue monitoring of VS


-IVF with  ml/h


-Rocephin 1 G IV QD


-Acetaminophen 650 mg PO Q6h PRN fever 


-Patient is stable, VS WNL, for now waiting pending UA, Urine C, BC and 

resolution of Leukocytosis.





NIDDM type 2


-Heart healthy/ Diabetic diet


-Insulin coverage SS





DVT Prophylaxis


-Lovenox 40 mg SC QD








<Hilary Chambers - Last Filed: 09/06/18 15:36>





Objective





- Vital Signs/Intake and Output


Vital Signs (last 24 hours): 


 











Temp Pulse Resp BP Pulse Ox


 


 98.5 F   83   18   91/54 L  95 


 


 09/06/18 12:33  09/06/18 12:33  09/06/18 12:33  09/06/18 12:33  09/06/18 12:33











- Medications


Medications: 


 Current Medications





Acetaminophen (Tylenol 325mg Tab)  650 mg PO Q6 PRN


   PRN Reason: Fever >100.4 F


   Last Admin: 09/05/18 17:23 Dose:  650 mg


Acetaminophen (Tylenol 325mg Tab)  650 mg PO Q6 PRN


   PRN Reason: Pain, Mild (1-3)


   Last Admin: 09/06/18 08:55 Dose:  650 mg


Dextrose (Dextrose 50% Inj)  0 ml IV STAT PRN; Protocol


   PRN Reason: Hypoglycemia Protocol


Dextrose (Glutose 15)  0 gm PO ONCE PRN; Protocol


   PRN Reason: Hypoglycemia Protocol


Dicyclomine HCl (Bentyl)  20 mg PO Q6 PRN


   PRN Reason: abdominal pain/cramps


Enoxaparin Sodium (Lovenox)  40 mg SC DAILY MADI


   PRN Reason: Protocol


   Last Admin: 09/06/18 08:42 Dose:  40 mg


Glucagon (Glucagen Diagnostic Kit)  0 mg IM STAT PRN; Protocol


   PRN Reason: Hypoglycemia Protocol


Sodium Chloride (Sodium Chloride 0.9%)  1,000 mls @ 100 mls/hr IV .Q10H [facility]


   Last Admin: 09/06/18 07:13 Dose:  100 mls/hr


Ceftriaxone Sodium 1 gm/ (Sodium Chloride)  100 mls @ 100 mls/hr IVPB DAILY MADI


   PRN Reason: Protocol


   Last Admin: 09/06/18 11:16 Dose:  100 mls/hr


Insulin Human Lispro (Humalog)  0 units SC ACHS MADI


   PRN Reason: Protocol


   Last Admin: 09/06/18 13:23 Dose:  Not Given


Ondansetron HCl (Zofran Inj)  4 mg IVP Q6 PRN


   PRN Reason: Nausea/Vomiting











- Labs


Labs: 


 





 09/06/18 05:20 





 09/06/18 05:20 





 











PT  11.7 Seconds (9.8-13.1)   09/05/18  09:33    


 


INR  1.1   09/05/18  09:33    


 


APTT  32.6 Seconds (25.6-37.1)   09/05/18  09:33    














Attending/Attestation





- Attestation


I have personally seen and examined this patient.: Yes


I have fully participated in the care of the patient.: Yes


I have reviewed all pertinent clinical information, including history, physical 

exam and plan: Yes


Notes (Text): 





09/06/18 15:36


Seen, examined, and discussed with resident. Agree with findings and plan as 

above.

## 2018-09-07 VITALS
RESPIRATION RATE: 20 BRPM | TEMPERATURE: 98.7 F | SYSTOLIC BLOOD PRESSURE: 106 MMHG | HEART RATE: 85 BPM | DIASTOLIC BLOOD PRESSURE: 61 MMHG

## 2018-09-07 LAB
BILIRUB UR-MCNC: NEGATIVE MG/DL
COLOR UR: YELLOW
ERYTHROCYTE [DISTWIDTH] IN BLOOD BY AUTOMATED COUNT: 14 % (ref 11.5–14.5)
GLUCOSE UR STRIP-MCNC: (no result) MG/DL
HGB BLD-MCNC: 12 G/DL (ref 12–16)
LEUKOCYTE ESTERASE UR-ACNC: (no result) LEU/UL
MCH RBC QN AUTO: 32 PG (ref 27–31)
MCHC RBC AUTO-ENTMCNC: 33.8 G/DL (ref 33–37)
MCV RBC AUTO: 94.6 FL (ref 81–99)
PH UR STRIP: 6 [PH] (ref 5–8)
PLATELET # BLD: 238 K/UL (ref 130–400)
PROT UR STRIP-MCNC: NEGATIVE MG/DL
RBC # BLD AUTO: 3.76 MIL/UL (ref 3.8–5.2)
RBC # UR STRIP: NEGATIVE /UL
SP GR UR STRIP: 1.01 (ref 1–1.03)
SQUAMOUS EPITHIAL: < 1 /HPF (ref 0–5)
URINE CLARITY: CLEAR
UROBILINOGEN UR-MCNC: (no result) MG/DL (ref 0.2–1)
WBC # BLD AUTO: 7.8 K/UL (ref 4.8–10.8)

## 2018-09-07 RX ADMIN — INSULIN LISPRO SCH: 100 INJECTION, SOLUTION INTRAVENOUS; SUBCUTANEOUS at 13:47

## 2018-09-07 RX ADMIN — INSULIN LISPRO SCH: 100 INJECTION, SOLUTION INTRAVENOUS; SUBCUTANEOUS at 09:48

## 2018-09-07 RX ADMIN — INSULIN LISPRO SCH: 100 INJECTION, SOLUTION INTRAVENOUS; SUBCUTANEOUS at 01:24

## 2018-09-07 RX ADMIN — ENOXAPARIN SODIUM SCH: 40 INJECTION SUBCUTANEOUS at 09:53

## 2018-09-07 NOTE — CP.[email or web address].DIS
Provider





- Provider


Date of Admission: 


09/06/18 08:56





Attending physician: 


Huber Teresa MD





Time Spent in preparation of Discharge (in minutes): 35





Diagnosis





- Discharge Diagnosis


(1) Cystitis


Status: Acute   





(2) Sepsis


Status: Resolved   





(3) Diabetes


Status: Chronic   





(4) Candidiasis, intertrigo


Status: Acute   





Hospital Course





- Lab Results


Lab Results: 


 Micro Results





09/05/18 09:50   Blood   Blood Culture - Preliminary


                            NO GROWTH AFTER 48 HOURS


09/05/18 09:33   Blood   Blood Culture - Preliminary


                            NO GROWTH AFTER 48 HOURS


09/05/18 12:00   Urine,Clean Catch   Urine Culture - Final


                            10-50,000 CFU/ML.


                            MULTIPLE SPECIES. PROBABLE CONTAMINATION.





 Most Recent Lab Values











WBC  7.8 K/uL (4.8-10.8)   09/07/18  09:11    


 


RBC  3.76 Mil/uL (3.80-5.20)  L  09/07/18  09:11    


 


Hgb  12.0 g/dL (12.0-16.0)   09/07/18  09:11    


 


Hct  35.6 % (34.0-47.0)   09/07/18  09:11    


 


MCV  94.6 fl (81.0-99.0)   09/07/18  09:11    


 


MCH  32.0 pg (27.0-31.0)  H  09/07/18  09:11    


 


MCHC  33.8 g/dL (33.0-37.0)   09/07/18  09:11    


 


RDW  14.0 % (11.5-14.5)   09/07/18  09:11    


 


Plt Count  238 K/uL (130-400)   09/07/18  09:11    


 


MPV  8.7 fl (7.2-11.7)   09/06/18  05:20    


 


Neut % (Auto)  88.0 % (50.0-75.0)  H  09/06/18  05:20    


 


Lymph % (Auto)  7.6 % (20.0-40.0)  L  09/06/18  05:20    


 


Mono % (Auto)  3.9 % (0.0-10.0)   09/06/18  05:20    


 


Eos % (Auto)  0.2 % (0.0-4.0)   09/06/18  05:20    


 


Baso % (Auto)  0.3 % (0.0-2.0)   09/06/18  05:20    


 


Neut # (Auto)  12.0 K/uL (1.8-7.0)  H  09/06/18  05:20    


 


Lymph # (Auto)  1.0 K/uL (1.0-4.3)   09/06/18  05:20    


 


Mono # (Auto)  0.5 K/uL (0.0-0.8)   09/06/18  05:20    


 


Eos # (Auto)  0.0 K/uL (0.0-0.7)   09/06/18  05:20    


 


Baso # (Auto)  0.0 K/uL (0.0-0.2)   09/06/18  05:20    


 


Neutrophils % (Manual)  87 % (42-75)  H  09/05/18  09:33    


 


Band Neutrophils %  4 % (0-2)  H  09/05/18  09:33    


 


Lymphocytes % (Manual)  5 % (20-50)  L  09/05/18  09:33    


 


Monocytes % (Manual)  4 % (0-10)   09/05/18  09:33    


 


Platelet Estimate  Normal  (NORMAL)   09/05/18  09:33    


 


RBC Morphology  Normal  (NORMAL)   09/05/18  09:33    


 


PT  11.7 Seconds (9.8-13.1)   09/05/18  09:33    


 


INR  1.1   09/05/18  09:33    


 


APTT  32.6 Seconds (25.6-37.1)   09/05/18  09:33    


 


pO2  37 mm/Hg (30-55)   09/05/18  09:30    


 


VBG pH  7.43  (7.32-7.43)   09/05/18  09:30    


 


VBG pCO2  42 mmHg (40-60)   09/05/18  09:30    


 


VBG HCO3  26.8 mmol/L  09/05/18  09:30    


 


VBG Total CO2  29.2 mmol/L (22-28)  H  09/05/18  09:30    


 


VBG O2 Sat (Calc)  80.1 % (40-65)  H  09/05/18  09:30    


 


VBG Base Excess  3.2 mmol/L (0.0-2.0)  H  09/05/18  09:30    


 


VBG Potassium  3.8 mmol/L (3.6-5.2)   09/05/18  09:30    


 


Sodium  138.0 mmol/L (132-148)   09/05/18  09:30    


 


Chloride  106.0 mmol/L ()   09/05/18  09:30    


 


Glucose  147 mg/dL ()  H  09/05/18  09:30    


 


Lactate  1.7 mmol/L (0.7-2.1)   09/05/18  09:30    


 


FiO2  21.0 %  09/05/18  09:30    


 


Sodium  139 mmol/l (132-148)   09/06/18  05:20    


 


Potassium  3.9 MMOL/L (3.6-5.0)   09/06/18  05:20    


 


Chloride  108 mmol/L ()  H  09/06/18  05:20    


 


Carbon Dioxide  27 mmol/L (22-30)   09/06/18  05:20    


 


Anion Gap  8  (10-20)  L  09/06/18  05:20    


 


BUN  9 mg/dl (7-17)   09/06/18  05:20    


 


Creatinine  0.5 mg/dl (0.7-1.2)  L  09/06/18  05:20    


 


Est GFR ( Amer)  > 60   09/06/18  05:20    


 


Est GFR (Non-Af Amer)  > 60   09/06/18  05:20    


 


POC Glucose (mg/dL)  118 mg/dL ()  H  09/06/18  21:10    


 


Random Glucose  114 mg/dL ()  H  09/06/18  05:20    


 


Calcium  8.9 mg/dL (8.4-10.2)   09/06/18  05:20    


 


Phosphorus  2.6 mg/dl (2.5-4.5)   09/05/18  09:33    


 


Magnesium  1.6 MG/DL (1.6-2.3)   09/05/18  09:33    


 


Total Bilirubin  0.5 mg/dl (0.2-1.3)   09/05/18  09:33    


 


AST  26 U/L (14-36)   09/05/18  09:33    


 


ALT  19 U/L (9-52)   09/05/18  09:33    


 


Alkaline Phosphatase  85 U/L ()   09/05/18  09:33    


 


Total Protein  7.8 G/DL (6.3-8.2)   09/05/18  09:33    


 


Albumin  4.3 g/dL (3.5-5.0)   09/05/18  09:33    


 


Globulin  3.5 gm/dL (2.2-3.9)   09/05/18  09:33    


 


Albumin/Globulin Ratio  1.2  (1.0-2.1)   09/05/18  09:33    


 


Procalcitonin  2.23 NG/ML (0.19-0.49)  H  09/06/18  09:13    


 


Venous Blood Potassium  3.8 mmol/L (3.6-5.2)   09/05/18  09:30    


 


Urine Color  Yellow  (YELLOW)   09/05/18  10:18    


 


Urine Clarity  Slighty-cloudy  (Clear)   09/05/18  10:18    


 


Urine pH  6.0  (5.0-8.0)   09/05/18  10:18    


 


Ur Specific Gravity  1.021  (1.003-1.030)   09/05/18  10:18    


 


Urine Protein  30 mg/dL (NEGATIVE)   09/05/18  10:18    


 


Urine Glucose (UA)  Neg mg/dL (Normal)   09/05/18  10:18    


 


Urine Ketones  Negative mg/dL (NEGATIVE)   09/05/18  10:18    


 


Urine Blood  Negative  (NEGATIVE)   09/05/18  10:18    


 


Urine Nitrate  Negative  (NEGATIVE)   09/05/18  10:18    


 


Urine Bilirubin  Negative  (NEGATIVE)   09/05/18  10:18    


 


Urine Urobilinogen  0.2-1.0 mg/dL (0.2-1.0)   09/05/18  10:18    


 


Ur Leukocyte Esterase  Neg Shelbi/uL (Negative)   09/05/18  10:18    


 


Urine RBC (Auto)  1 /hpf (0-3)   09/05/18  10:18    


 


Urine Microscopic WBC  1 /hpf (0-5)   09/05/18  10:18    


 


Ur Squamous Epith Cells  1 /hpf (0-5)   09/05/18  10:18    


 


Urine Bacteria  Rare  (<OCC)   09/05/18  10:18    


 


Influenza Typ A,B (EIA)  Negative for flu a/b  (NEGATIVE)   09/05/18  09:50    














- Hospital Course


Hospital Course: 





74 y/o Female admitted for Sepsis likely secondary to Cystitis, with PMHx of 

NIDDM type 2, Uterine CA/cervical CA treated with chemo and radiation 13 years 

ago. During hospital stay patient was treated with IV antibiotic Rocephin 1G QD 

due to Sepsis 2/2 cystitis with WBC on admission of 17.7, she had significant 

improvement of symptoms and satisfactory progress, upon D/C home VS were 

reviewed and found WNL, WBC leukocytosis resolved 7.8, she denies abdominal pain

, N/V/D/C, dysuria, chest pain, palpitations, HA, fever, chills, Urinary 

incontinence. Patient instructions were given to f/u at [facility] clinic. Ed 

instruction given.


Final Impression:


Sepsis secondary to cystitis resolved at this time.


MEDS on D/C: Nystatin topical added for tx of intertrigo candidiasis. (see 

prescription section).





 





Discharge Exam





- Head Exam


Head Exam: ATRAUMATIC, NORMOCEPHALIC





- Eye Exam


Eye Exam: EOMI, PERRL





- ENT Exam


ENT Exam: Mucous Membranes Moist





- Respiratory Exam


Respiratory Exam: Clear to PA & Lateral





- Cardiovascular Exam


Cardiovascular Exam: REGULAR RHYTHM, +S1, +S2





- GI/Abdominal Exam


GI & Abdominal Exam: Normal Bowel Sounds.  absent: Mass, Tenderness





- Extremities Exam


Extremities exam: full ROM





- Back Exam


Back exam: absent: CVA tenderness (L), CVA tenderness (R)





- Neurological Exam


Neurological exam: Alert, Oriented x3





- Psychiatric Exam


Psychiatric exam: Normal Affect, Normal Mood





- Skin


Skin Exam: Dry, Normal Color


Additional comments: 





noted mild erythematous area under L Breast area





Discharge Plan





- Discharge Medications


Prescriptions: 


Aspirin [Ecotrin] 81 mg PO DAILY #30 tabec


Ciprofloxacin [Cipro] 500 mg PO BID #14 tab


Lisinopril [Zestril] 5 mg PO DAILY #30 tab


Nystatin 60 gm TP TID #1 powder





- Follow Up Plan


Condition: STABLE


Disposition: HOME/ ROUTINE


Instructions:  Urinary Tract Infection in Women (DC)


Referrals: 


[email or web address] [Provider Group]

## 2018-09-08 VITALS — OXYGEN SATURATION: 95 %
